# Patient Record
Sex: FEMALE | Race: WHITE | Employment: OTHER | URBAN - METROPOLITAN AREA
[De-identification: names, ages, dates, MRNs, and addresses within clinical notes are randomized per-mention and may not be internally consistent; named-entity substitution may affect disease eponyms.]

---

## 2017-09-28 ENCOUNTER — APPOINTMENT (OUTPATIENT)
Dept: CT IMAGING | Age: 67
DRG: 378 | End: 2017-09-28
Attending: EMERGENCY MEDICINE
Payer: MEDICARE

## 2017-09-28 ENCOUNTER — HOSPITAL ENCOUNTER (INPATIENT)
Age: 67
LOS: 3 days | Discharge: HOME | DRG: 378 | End: 2017-10-03
Attending: EMERGENCY MEDICINE | Admitting: INTERNAL MEDICINE
Payer: MEDICARE

## 2017-09-28 ENCOUNTER — APPOINTMENT (OUTPATIENT)
Dept: ULTRASOUND IMAGING | Age: 67
DRG: 378 | End: 2017-09-28
Attending: INTERNAL MEDICINE
Payer: MEDICARE

## 2017-09-28 ENCOUNTER — APPOINTMENT (OUTPATIENT)
Dept: GENERAL RADIOLOGY | Age: 67
DRG: 378 | End: 2017-09-28
Attending: EMERGENCY MEDICINE
Payer: MEDICARE

## 2017-09-28 DIAGNOSIS — R11.2 INTRACTABLE VOMITING WITH NAUSEA, UNSPECIFIED VOMITING TYPE: Primary | ICD-10-CM

## 2017-09-28 DIAGNOSIS — R00.0 TACHYCARDIA: ICD-10-CM

## 2017-09-28 DIAGNOSIS — R53.1 WEAKNESS: ICD-10-CM

## 2017-09-28 LAB
ALBUMIN SERPL-MCNC: 3 G/DL (ref 3.2–4.6)
ALBUMIN/GLOB SERPL: 0.6 {RATIO} (ref 1.2–3.5)
ALP SERPL-CCNC: 91 U/L (ref 50–136)
ALT SERPL-CCNC: 22 U/L (ref 12–65)
ANION GAP SERPL CALC-SCNC: 13 MMOL/L (ref 7–16)
AST SERPL-CCNC: 52 U/L (ref 15–37)
ATRIAL RATE: 109 BPM
BACTERIA URNS QL MICRO: ABNORMAL /HPF
BASOPHILS # BLD: 0 K/UL (ref 0–0.2)
BASOPHILS NFR BLD: 1 % (ref 0–2)
BILIRUB SERPL-MCNC: 0.9 MG/DL (ref 0.2–1.1)
BUN SERPL-MCNC: 9 MG/DL (ref 8–23)
CALCIUM SERPL-MCNC: 9.1 MG/DL (ref 8.3–10.4)
CALCULATED P AXIS, ECG09: 64 DEGREES
CALCULATED R AXIS, ECG10: 64 DEGREES
CALCULATED T AXIS, ECG11: 70 DEGREES
CASTS URNS QL MICRO: ABNORMAL /LPF
CHLORIDE SERPL-SCNC: 102 MMOL/L (ref 98–107)
CO2 SERPL-SCNC: 24 MMOL/L (ref 21–32)
CREAT SERPL-MCNC: 0.81 MG/DL (ref 0.6–1)
CRYSTALS URNS QL MICRO: 0 /LPF
D DIMER PPP FEU-MCNC: 1.83 UG/ML(FEU)
DIAGNOSIS, 93000: NORMAL
DIFFERENTIAL METHOD BLD: ABNORMAL
EOSINOPHIL # BLD: 0.6 K/UL (ref 0–0.8)
EOSINOPHIL NFR BLD: 7 % (ref 0.5–7.8)
EPI CELLS #/AREA URNS HPF: ABNORMAL /HPF
ERYTHROCYTE [DISTWIDTH] IN BLOOD BY AUTOMATED COUNT: 12.9 % (ref 11.9–14.6)
GLOBULIN SER CALC-MCNC: 5.2 G/DL (ref 2.3–3.5)
GLUCOSE SERPL-MCNC: 72 MG/DL (ref 65–100)
HCT VFR BLD AUTO: 36.6 % (ref 35.8–46.3)
HGB BLD-MCNC: 12.6 G/DL (ref 11.7–15.4)
IMM GRANULOCYTES # BLD: 0 K/UL (ref 0–0.5)
IMM GRANULOCYTES NFR BLD: 0.4 % (ref 0–5)
LACTATE BLD-SCNC: 1.6 MMOL/L (ref 0.5–1.9)
LIPASE SERPL-CCNC: 625 U/L (ref 73–393)
LYMPHOCYTES # BLD: 2.6 K/UL (ref 0.5–4.6)
LYMPHOCYTES NFR BLD: 30 % (ref 13–44)
MCH RBC QN AUTO: 28.8 PG (ref 26.1–32.9)
MCHC RBC AUTO-ENTMCNC: 34.4 G/DL (ref 31.4–35)
MCV RBC AUTO: 83.6 FL (ref 79.6–97.8)
MONOCYTES # BLD: 0.9 K/UL (ref 0.1–1.3)
MONOCYTES NFR BLD: 10 % (ref 4–12)
MUCOUS THREADS URNS QL MICRO: ABNORMAL /LPF
NEUTS SEG # BLD: 4.5 K/UL (ref 1.7–8.2)
NEUTS SEG NFR BLD: 52 % (ref 43–78)
OTHER OBSERVATIONS,UCOM: ABNORMAL
P-R INTERVAL, ECG05: 124 MS
PLATELET # BLD AUTO: 627 K/UL (ref 150–450)
PMV BLD AUTO: 9 FL (ref 10.8–14.1)
POTASSIUM SERPL-SCNC: 4 MMOL/L (ref 3.5–5.1)
PROCALCITONIN SERPL-MCNC: <0.1 NG/ML
PROT SERPL-MCNC: 8.2 G/DL (ref 6.3–8.2)
Q-T INTERVAL, ECG07: 294 MS
QRS DURATION, ECG06: 86 MS
QTC CALCULATION (BEZET), ECG08: 395 MS
RBC # BLD AUTO: 4.38 M/UL (ref 4.05–5.25)
RBC #/AREA URNS HPF: ABNORMAL /HPF
SODIUM SERPL-SCNC: 139 MMOL/L (ref 136–145)
T4 FREE SERPL-MCNC: 1.7 NG/DL (ref 0.78–1.46)
TROPONIN I BLD-MCNC: 0 NG/ML (ref 0.02–0.05)
TSH SERPL DL<=0.005 MIU/L-ACNC: 2.08 UIU/ML (ref 0.36–3.74)
VENTRICULAR RATE, ECG03: 109 BPM
WBC # BLD AUTO: 8.6 K/UL (ref 4.3–11.1)
WBC URNS QL MICRO: ABNORMAL /HPF

## 2017-09-28 PROCEDURE — 84443 ASSAY THYROID STIM HORMONE: CPT | Performed by: EMERGENCY MEDICINE

## 2017-09-28 PROCEDURE — 96376 TX/PRO/DX INJ SAME DRUG ADON: CPT | Performed by: EMERGENCY MEDICINE

## 2017-09-28 PROCEDURE — 83690 ASSAY OF LIPASE: CPT | Performed by: EMERGENCY MEDICINE

## 2017-09-28 PROCEDURE — 85025 COMPLETE CBC W/AUTO DIFF WBC: CPT | Performed by: EMERGENCY MEDICINE

## 2017-09-28 PROCEDURE — 81015 MICROSCOPIC EXAM OF URINE: CPT | Performed by: EMERGENCY MEDICINE

## 2017-09-28 PROCEDURE — 81003 URINALYSIS AUTO W/O SCOPE: CPT | Performed by: EMERGENCY MEDICINE

## 2017-09-28 PROCEDURE — 84439 ASSAY OF FREE THYROXINE: CPT | Performed by: EMERGENCY MEDICINE

## 2017-09-28 PROCEDURE — 71260 CT THORAX DX C+: CPT

## 2017-09-28 PROCEDURE — 99285 EMERGENCY DEPT VISIT HI MDM: CPT | Performed by: EMERGENCY MEDICINE

## 2017-09-28 PROCEDURE — 80053 COMPREHEN METABOLIC PANEL: CPT | Performed by: EMERGENCY MEDICINE

## 2017-09-28 PROCEDURE — 83605 ASSAY OF LACTIC ACID: CPT

## 2017-09-28 PROCEDURE — 99218 HC RM OBSERVATION: CPT

## 2017-09-28 PROCEDURE — 74022 RADEX COMPL AQT ABD SERIES: CPT

## 2017-09-28 PROCEDURE — 85379 FIBRIN DEGRADATION QUANT: CPT | Performed by: EMERGENCY MEDICINE

## 2017-09-28 PROCEDURE — 74011250636 HC RX REV CODE- 250/636: Performed by: EMERGENCY MEDICINE

## 2017-09-28 PROCEDURE — 74011000258 HC RX REV CODE- 258: Performed by: EMERGENCY MEDICINE

## 2017-09-28 PROCEDURE — 84484 ASSAY OF TROPONIN QUANT: CPT

## 2017-09-28 PROCEDURE — 74011636320 HC RX REV CODE- 636/320: Performed by: EMERGENCY MEDICINE

## 2017-09-28 PROCEDURE — 96361 HYDRATE IV INFUSION ADD-ON: CPT | Performed by: EMERGENCY MEDICINE

## 2017-09-28 PROCEDURE — 96374 THER/PROPH/DIAG INJ IV PUSH: CPT | Performed by: EMERGENCY MEDICINE

## 2017-09-28 PROCEDURE — 96375 TX/PRO/DX INJ NEW DRUG ADDON: CPT | Performed by: EMERGENCY MEDICINE

## 2017-09-28 PROCEDURE — 96372 THER/PROPH/DIAG INJ SC/IM: CPT | Performed by: EMERGENCY MEDICINE

## 2017-09-28 PROCEDURE — 74011250637 HC RX REV CODE- 250/637: Performed by: INTERNAL MEDICINE

## 2017-09-28 PROCEDURE — 84145 PROCALCITONIN (PCT): CPT | Performed by: EMERGENCY MEDICINE

## 2017-09-28 PROCEDURE — 93005 ELECTROCARDIOGRAM TRACING: CPT | Performed by: EMERGENCY MEDICINE

## 2017-09-28 PROCEDURE — 76705 ECHO EXAM OF ABDOMEN: CPT

## 2017-09-28 RX ORDER — SODIUM CHLORIDE 0.9 % (FLUSH) 0.9 %
5-10 SYRINGE (ML) INJECTION EVERY 8 HOURS
Status: DISCONTINUED | OUTPATIENT
Start: 2017-09-29 | End: 2017-10-03 | Stop reason: HOSPADM

## 2017-09-28 RX ORDER — MORPHINE SULFATE 4 MG/ML
4 INJECTION, SOLUTION INTRAMUSCULAR; INTRAVENOUS
Status: COMPLETED | OUTPATIENT
Start: 2017-09-28 | End: 2017-09-28

## 2017-09-28 RX ORDER — ONDANSETRON 2 MG/ML
4 INJECTION INTRAMUSCULAR; INTRAVENOUS
Status: COMPLETED | OUTPATIENT
Start: 2017-09-28 | End: 2017-09-28

## 2017-09-28 RX ORDER — SODIUM CHLORIDE 0.9 % (FLUSH) 0.9 %
10 SYRINGE (ML) INJECTION
Status: COMPLETED | OUTPATIENT
Start: 2017-09-28 | End: 2017-09-28

## 2017-09-28 RX ORDER — ONDANSETRON 4 MG/1
4 TABLET, ORALLY DISINTEGRATING ORAL
Status: DISCONTINUED | OUTPATIENT
Start: 2017-09-28 | End: 2017-10-03 | Stop reason: HOSPADM

## 2017-09-28 RX ORDER — HEPARIN SODIUM 5000 [USP'U]/ML
5000 INJECTION, SOLUTION INTRAVENOUS; SUBCUTANEOUS EVERY 8 HOURS
Status: DISCONTINUED | OUTPATIENT
Start: 2017-09-29 | End: 2017-09-29

## 2017-09-28 RX ORDER — ACETAMINOPHEN 325 MG/1
650 TABLET ORAL
Status: DISCONTINUED | OUTPATIENT
Start: 2017-09-28 | End: 2017-10-03 | Stop reason: HOSPADM

## 2017-09-28 RX ORDER — FAMOTIDINE 20 MG/1
20 TABLET, FILM COATED ORAL 2 TIMES DAILY
Status: DISCONTINUED | OUTPATIENT
Start: 2017-09-29 | End: 2017-09-29

## 2017-09-28 RX ORDER — SODIUM CHLORIDE 9 MG/ML
75 INJECTION, SOLUTION INTRAVENOUS CONTINUOUS
Status: DISCONTINUED | OUTPATIENT
Start: 2017-09-29 | End: 2017-09-29

## 2017-09-28 RX ORDER — SODIUM CHLORIDE 0.9 % (FLUSH) 0.9 %
5-10 SYRINGE (ML) INJECTION AS NEEDED
Status: DISCONTINUED | OUTPATIENT
Start: 2017-09-28 | End: 2017-10-03 | Stop reason: HOSPADM

## 2017-09-28 RX ORDER — PROMETHAZINE HYDROCHLORIDE 25 MG/ML
25 INJECTION, SOLUTION INTRAMUSCULAR; INTRAVENOUS
Status: COMPLETED | OUTPATIENT
Start: 2017-09-28 | End: 2017-09-28

## 2017-09-28 RX ADMIN — SODIUM CHLORIDE 1000 ML: 900 INJECTION, SOLUTION INTRAVENOUS at 21:14

## 2017-09-28 RX ADMIN — SODIUM CHLORIDE 1000 ML: 900 INJECTION, SOLUTION INTRAVENOUS at 15:51

## 2017-09-28 RX ADMIN — IOPAMIDOL 100 ML: 755 INJECTION, SOLUTION INTRAVENOUS at 18:52

## 2017-09-28 RX ADMIN — Medication 10 ML: at 18:53

## 2017-09-28 RX ADMIN — SODIUM CHLORIDE 100 ML: 900 INJECTION, SOLUTION INTRAVENOUS at 18:53

## 2017-09-28 RX ADMIN — ONDANSETRON 4 MG: 2 INJECTION INTRAMUSCULAR; INTRAVENOUS at 15:51

## 2017-09-28 RX ADMIN — PROMETHAZINE HYDROCHLORIDE 25 MG: 25 INJECTION INTRAMUSCULAR; INTRAVENOUS at 21:14

## 2017-09-28 RX ADMIN — MORPHINE SULFATE 4 MG: 4 INJECTION, SOLUTION INTRAMUSCULAR; INTRAVENOUS at 18:11

## 2017-09-28 RX ADMIN — ONDANSETRON 4 MG: 2 INJECTION INTRAMUSCULAR; INTRAVENOUS at 18:11

## 2017-09-28 NOTE — IP AVS SNAPSHOT
Summary of Care Report The Summary of Care report has been created to help improve care coordination. Users with access to [facility] or [facility] [address] (Web-based application) may access additional patient information including the Discharge Summary. If you are not currently a [address] user and need more information, please call the number listed below in the [place] [address] section and ask to be connected with Medical Records. Facility Information Name Address Phone [address] [address] [address] [phone] Patient Information Patient Name Sex  Olivia Keith ([id number]) Female 33 17 13 Discharge Information Admitting Provider Service Area Unit Cayden Moreno MD / [id number] Med Surg / [phone] Discharge Provider Discharge Date/Time Discharge Disposition Destination (none) 10/3/2017 Morning (Pending) AHR (none) Patient Language Language ENGLISH [13] Hospital Problems as of 10/3/2017  Never Reviewed Class Noted - Resolved Last Modified POA Active Problems Intractable nausea and vomiting  2017 - Present 10/3/2017 by Mukund Paniagua MD Yes Entered by Cayden Moreno MD  
  * (Principal)Duodenal ulcer  10/3/2017 - Present 10/3/2017 by Mukund Paniagua MD Yes Entered by Mukund Paniagua MD  
  Hypertension (Chronic)  10/3/2017 - Present 10/3/2017 by Mukund Paniagua MD Yes Entered by Mukund Paniagua MD  
  
Non-Hospital Problems as of 10/3/2017  Never Reviewed Class Noted - Resolved Last Modified Active Problems CVA (cerebral infarction)  10/27/2015 - Present 10/28/2015 by Scotty Reeves MD  
  Entered by Levar Logan   Basal ganglia infarction ([facility] [address]  10/28/2015 - Present 10/28/2015 by Scotty Reeves MD  
 Entered by Mikaela Hills MD  
  Left hemiparesis ([address]  10/28/2015 - Present 10/28/2015 by Mikaela Hills MD  
  Entered by Mikaela Hills MD  
  
You are allergic to the following No active allergies Current Discharge Medication List  
  
START taking these medications Dose & Instructions Dispensing Information Comments  
 acetaminophen 325 mg tablet Commonly known as:  TYLENOL Dose:  650 mg Take 2 Tabs by mouth every four (4) hours as needed. Quantity:  12 Tab Refills:  0  
   
 atorvastatin 40 mg tablet Commonly known as:  LIPITOR Dose:  40 mg Take 1 Tab by mouth nightly. Quantity:  1 Tab Refills:  0  
   
 metoprolol tartrate 25 mg tablet Commonly known as:  LOPRESSOR Dose:  12.5 mg Take 0.5 Tabs by mouth two (2) times a day. Quantity:  1 Tab Refills:  0  
   
 pantoprazole 40 mg tablet Commonly known as:  PROTONIX Dose:  40 mg Take 1 Tab by mouth two (2) times a day. Quantity:  60 Tab Refills:  0  
   
 promethazine 25 mg tablet Commonly known as:  PHENERGAN Dose:  25 mg Take 1 Tab by mouth every eight (8) hours as needed for Nausea. Quantity:  12 Tab Refills:  0 CONTINUE these medications which have CHANGED Dose & Instructions Dispensing Information Comments  
 lisinopril 5 mg tablet Commonly known as:  Natty Archer What changed:   
- medication strength 
- how much to take Dose:  2.5 mg Take 0.5 Tabs by mouth daily. Quantity:  30 Tab Refills:  0 CONTINUE these medications which have NOT CHANGED Dose & Instructions Dispensing Information Comments  
 aspirin 325 mg tablet Commonly known as:  ASPIRIN Start taking on:  10/10/2017 Dose:  325 mg Take 1 Tab by mouth daily. Quantity:  1 Tab Refills:  0 HYDROcodone-acetaminophen 5-325 mg per tablet Commonly known as:  Sterling Dolphin Dose:  1 Tab Take 1 Tab by mouth every six (6) hours as needed. Max Daily Amount: 4 Tabs. Quantity:  20 Tab Refills:  0 PAXIL 10 mg tablet Generic drug:  PARoxetine Dose:  10 mg Take 10 mg by mouth daily. Refills:  0 SINGULAIR 10 mg tablet Generic drug:  montelukast  
 Dose:  10 mg Take 10 mg by mouth daily. Refills:  0 ZyrTEC 10 mg Cap Generic drug:  Cetirizine Take  by mouth. Refills:  0 STOP taking these medications Comments  
 pravastatin 40 mg tablet Commonly known as:  PRAVACHOL Current Immunizations Name Date  
 TB Skin Test (PPD) Intradermal 8/24/2017, 10/28/2015 Surgery Information ID Date/Time Status Primary Surgeon All Procedures Location [id number] 10/2/2017 11:15 AM Posted Julita Brito MD ESOPHAGOGASTRODUODENOSCOPY (EGD) SIGMOIDOSCOPY FLEXIBLE 
ESOPHAGOGASTRODUODENAL (EGD) BIOPSY [facility]    
 ESOPHAGOGASTRODUODENOSCOPY (EGD):  room 627 ESOPHAGOGASTRODUODENAL (EGD) BIOPSY:  gastric bx's Follow-up Information Follow up With Details Comments Contact Info [facility] On 10/25/2017 at 10:15 [address] [facility] [address] [facility] [phone] 
[phone] [facility] On 10/5/2017 at 9:00 Doctor Shi Landin1 
[phone] Discharge Instructions Hold Aspirin for 7 days Take Protonix 40 mg PO BID until seen by gastroenterology AVOID NSAIDs (Advisl, Naproxen, etc), spicy food, alcohol Follow-up with PCP in 3-5 days with repeat CBC, BMP and Mg level at that time Follow-up with gastroenterology in 2 weeks H. Pilory IgM  And pathology pending at the time of discharge, please discuss with gastroenterology and PCP results and further management.   
If any worrisome signs or symptoms, alike increased abdominal pain, fevers, diarrhea, blood in stool or vomitus please call PCP, Gastroenterology, 911 or go to nearest ED. DISCHARGE SUMMARY from Nurse The following personal items are in your possession at time of discharge: 
 
Dental Appliances: None Visual Aid: Contacts, At home Hearing Aids/Status: At home, Bilateral 
Home Medications:  (Inhaler;  will bring meds in am ) Jewelry: None Clothing: At bedside Other Valuables: Cell Phone, At bedside Personal Items Sent to Safe:  (None) PATIENT INSTRUCTIONS: 
 
 
F-face looks uneven A-arms unable to move or move unevenly S-speech slurred or non-existent T-time-call 911 as soon as signs and symptoms begin-DO NOT go Back to bed or wait to see if you get better-TIME IS BRAIN. Warning Signs of HEART ATTACK Call 911 if you have these symptoms: 
? Chest discomfort. Most heart attacks involve discomfort in the center of the chest that lasts more than a few minutes, or that goes away and comes back. It can feel like uncomfortable pressure, squeezing, fullness, or pain. ? Discomfort in other areas of the upper body. Symptoms can include pain or discomfort in one or both arms, the back, neck, jaw, or stomach. ? Shortness of breath with or without chest discomfort. ? Other signs may include breaking out in a cold sweat, nausea, or lightheadedness. Don't wait more than five minutes to call [address]! Fast action can save your life. Calling 911 is almost always the fastest way to get lifesaving treatment. Emergency Medical Services staff can begin treatment when they arrive  up to an hour sooner than if someone gets to the hospital by car. The discharge information has been reviewed with the patient. The patient verbalized understanding.  
 
Discharge medications reviewed with the patient and appropriate educational materials and side effects teaching were provided. Chart Review Routing History No Routing History on File

## 2017-09-28 NOTE — ED PROVIDER NOTES
HPI Comments: Patient coming in with persistent fatigue generalized weakness poor appetite some persistent abdominal pain. She's been admitted [facility] twice over the last month after having a cholecystectomy followed by  Persistent pain and vomiting. She states they never really found anything else wrong with her but she has continued to not do well she went and saw her PCP today and was tachycardic with a low blood pressure as well. She states they were concerned she was getting dehydrated. She did not wish to go back to [facility] where she had her surgery. Patient is a 79 y.o. female presenting with abdominal pain. The history is provided by the patient. Abdominal Pain    Associated symptoms include nausea and vomiting. Pertinent negatives include no fever, no diarrhea, no constipation and no back pain. Past Medical History:   Diagnosis Date    Hypertension     Stroke [facility])        Past Surgical History:   Procedure Laterality Date    HX CHOLECYSTECTOMY      HX GYN      hysterectomy    HX HEENT      left ear surgery         History reviewed. No pertinent family history. Social History     Social History    Marital status:      Spouse name: N/A    Number of children: N/A    Years of education: N/A     Occupational History    Not on file. Social History Main Topics    Smoking status: Never Smoker    Smokeless tobacco: Not on file    Alcohol use No    Drug use: No    Sexual activity: Not on file     Other Topics Concern    Not on file     Social History Narrative         ALLERGIES: Review of patient's allergies indicates no known allergies. Review of Systems   Constitutional: Positive for activity change, appetite change and fatigue. Negative for fever. Respiratory: Positive for shortness of breath. Negative for apnea, cough and chest tightness. Gastrointestinal: Positive for abdominal pain, nausea and vomiting.  Negative for constipation and diarrhea. Musculoskeletal: Negative for back pain. Skin: Negative for color change and wound. Neurological: Positive for weakness. Negative for seizures, speech difficulty and numbness. All other systems reviewed and are negative. Vitals:    09/28/17 1505   BP: 102/78   Pulse: (!) 140   Resp: 26   Temp: 97.8 °F (36.6 °C)   SpO2: 97%   Weight: 59.9 kg (132 lb)   Height: 5' 2\" (1.575 m)            Physical Exam   Constitutional: She is oriented to person, place, and time. She appears well-developed and well-nourished. No distress. HENT:   Head: Normocephalic and atraumatic. Dry mucus membranes   Eyes: Conjunctivae are normal. No scleral icterus. Neck: Normal range of motion. Neck supple. Cardiovascular: Normal rate, regular rhythm and normal heart sounds. Pulmonary/Chest: Effort normal and breath sounds normal. No stridor. No respiratory distress. She has no wheezes. She has no rales. She exhibits no tenderness. Abdominal: Soft. Bowel sounds are normal. She exhibits no distension. There is tenderness (slight tenderness to the epigastric region. incisions c/d/i). There is no rebound and no guarding. Neurological: She is alert and oriented to person, place, and time. No focal weakness   Skin: Skin is warm and dry. No rash noted. She is not diaphoretic. No erythema. There is pallor. Psychiatric: She has a normal mood and affect. Her behavior is normal.   Nursing note and vitals reviewed. MDM  Number of Diagnoses or Management Options  Intractable vomiting with nausea, unspecified vomiting type: Tachycardia:   Weakness:   Diagnosis management comments: Patient has persistent nausea and still does not feel well. I have done an extensive workup ruling out pulmonary embolism she does still have a persistent tachycardia but this is improved with fluid I am giving her additional fluid now. Patient does have a lot of ketones in her urine, lipase slightly elevated as is free T4.    I have discussed case with hospitalist for further evaluation. Yeimy Conley MD; 9/28/2017 @8:54 PM Voice dictation software was used during the making of this note. This software is not perfect and grammatical and other typographical errors may be present.   This note has not been proofread for errors.  ===================================================================        Amount and/or Complexity of Data Reviewed  Clinical lab tests: ordered and reviewed (Results for orders placed or performed during the hospital encounter of 09/28/17  -CBC WITH AUTOMATED DIFF       Result                                            Value                         Ref Range                       WBC                                               8.6                           4.3 - 11.1 K/uL                 RBC                                               4.38                          4.05 - 5.25 M/uL                HGB                                               12.6                          11.7 - 15.4 g/dL                HCT                                               36.6                          35.8 - 46.3 %                   MCV                                               83.6                          79.6 - 97.8 FL                  MCH                                               28.8                          26.1 - 32.9 PG                  MCHC                                              34.4                          31.4 - 35.0 g/dL                RDW                                               12.9                          11.9 - 14.6 %                   PLATELET                                          627 (H)                       150 - 450 K/uL                  MPV                                               9.0 (L)                       10.8 - 14.1 FL                  DF                                                AUTOMATED                                                     NEUTROPHILS 52                            43 - 78 %                       LYMPHOCYTES                                       30                            13 - 44 %                       MONOCYTES                                         10                            4.0 - 12.0 %                    EOSINOPHILS                                       7                             0.5 - 7.8 %                     BASOPHILS                                         1                             0.0 - 2.0 %                     IMMATURE GRANULOCYTES                             0.4                           0.0 - 5.0 %                     ABS. NEUTROPHILS                                  4.5                           1.7 - 8.2 K/UL                  ABS. LYMPHOCYTES                                  2.6                           0.5 - 4.6 K/UL                  ABS. MONOCYTES                                    0.9                           0.1 - 1.3 K/UL                  ABS. EOSINOPHILS                                  0.6                           0.0 - 0.8 K/UL                  ABS. BASOPHILS                                    0.0                           0.0 - 0.2 K/UL                  ABS. IMM.  GRANS.                                  0.0                           0.0 - 0.5 K/UL             -METABOLIC PANEL, COMPREHENSIVE       Result                                            Value                         Ref Range                       Sodium                                            139                           136 - 145 mmol/L                Potassium                                         4.0                           3.5 - 5.1 mmol/L                Chloride                                          102                           98 - 107 mmol/L                 CO2                                               24                            21 - 32 mmol/L                  Anion gap 13                            7 - 16 mmol/L                   Glucose                                           72                            65 - 100 mg/dL                  BUN                                               9                             8 - 23 MG/DL                    Creatinine                                        0.81                          0.6 - 1.0 MG/DL                 GFR est AA                                        >60                           >60 ml/min/1.73m2               GFR est non-AA                                    >60                           >60 ml/min/1.73m2               Calcium                                           9.1                           8.3 - 10.4 MG/DL                Bilirubin, total                                  0.9                           0.2 - 1.1 MG/DL                 ALT (SGPT)                                        22                            12 - 65 U/L                     AST (SGOT)                                        52 (H)                        15 - 37 U/L                     Alk. phosphatase                                  91                            50 - 136 U/L                    Protein, total                                    8.2                           6.3 - 8.2 g/dL                  Albumin                                           3.0 (L)                       3.2 - 4.6 g/dL                  Globulin                                          5.2 (H)                       2.3 - 3.5 g/dL                  A-G Ratio                                         0.6 (L)                       1.2 - 3.5                  -LIPASE       Result                                            Value                         Ref Range                       Lipase                                            625 (H)                       73 - 393 U/L               -PROCALCITONIN       Result                                            Value Ref Range                       Procalcitonin                                     <0.1                          ng/mL                      -D DIMER       Result                                            Value                         Ref Range                       D DIMER                                           1.83 (HH)                     <0.55 ug/ml(FEU)           -TSH 3RD GENERATION       Result                                            Value                         Ref Range                       TSH                                               2.080                         0.358 - 3.740 uIU/mL       -T4, FREE       Result                                            Value                         Ref Range                       T4, Free                                          1.7 (H)                       0.78 - 1.46 NG/DL          -POC TROPONIN-I       Result                                            Value                         Ref Range                       Troponin-I (POC)                                  0 (L)                         0.02 - 0.05 ng/ml          -POC LACTIC ACID       Result                                            Value                         Ref Range                       Lactic Acid (POC)                                 1.6                           0.5 - 1.9 mmol/L           -EKG, 12 LEAD, INITIAL       Result                                            Value                         Ref Range                       Ventricular Rate                                  109                           BPM                             Atrial Rate                                       109                           BPM                             P-R Interval                                      124                           ms                              QRS Duration                                      86                            ms Q-T Interval                                      294                           ms                              QTC Calculation (Bezet)                           395                           ms                              Calculated P Axis                                 64                            degrees                         Calculated R Axis                                 64                            degrees                         Calculated T Axis                                 70                            degrees                         Diagnosis                                                                                                   !! AGE AND GENDER SPECIFIC ECG ANALYSIS !! Sinus tachycardia   Nonspecific T wave abnormality   Abnormal ECG   When compared with ECG of 26-OCT-2015 15:06,   Nonspecific T wave abnormality now evident in Lateral leads   QT has shortened   Confirmed by DIANA RODARTE (), Carlos Alberto Monotya ([id number]) on 9/28/2017 6:07:32 PM    )  Tests in the radiology section of CPT®: ordered and reviewed (Xr Abd Acute W 1 V Chest    Result Date: 9/28/2017  ACUTE ABDOMINAL SERIES, 3 VIEWS. HISTORY: Vomiting. Recent cholecystectomy. . TECHNIQUE: AP view of the chest, flat and upright views of the abdomen on a total of 4 images. COMPARISON: 26 October 2015. FINDINGS: The bowel gas pattern is unremarkable. No abnormally dilated bowel loops. No free air under the hemidiaphragm. No radiopaque calculi. The lung bases are clear. Surgical changes right upper quadrant. Electronic device over the chest.     IMPRESSION: Negative for free air, ileus or obstruction. Ct Chest W Cont    Result Date: 9/28/2017  Chest CT INDICATION:  Recent gallbladder surgery, now tachycardia and shortness of breath Multiple axial images were obtained through the chest during intravenous infusion of 100mL of Isovue 370. Coronal reformats were also evaluated.  Radiation dose reduction techniques were used for this study: All CT scans performed at this facility use one or all of the following: Automated exposure control, adjustment of the mA and/or kVp according to patient's size, iterative reconstruction. FINDINGS: There is normal enhancement of the major pulmonary vessels. There is no CT evidence of pulmonary embolus. There is also normal enhancement of the aorta. There is no dissection or aneurysm. There is a 3 m nodule in the right lower lobe and a 2 mm nodule in the right middle lobe. There are no significant infiltrates. There are no effusions. There is no significant mediastinal or axillary adenopathy. There are no bony lesions. Limited imaging of the upper abdomen is also unremarkable. IMPRESSION: 1. No CT evidence of pulmonary embolus. 2.  Small nodules in the right lung base, likely benign.   If the patient has a smoking history or other risk factors, consider 6 month follow-up.    )  Independent visualization of images, tracings, or specimens: yes      ED Course       Procedures

## 2017-09-28 NOTE — IP AVS SNAPSHOT
Ronak Adair 
 
 
 [address] 
[phone] Patient: Bryan Lyn MRN: [id number] [facility]:[birth year] You are allergic to the following No active allergies Recent Documentation Height Weight Breastfeeding? BMI OB Status Smoking Status 1.575 m 59.9 kg No 24.14 kg/m2 Hysterectomy Never Smoker Emergency Contacts Name Discharge Info Relation Home Work Mobile Oral Priestly  Spouse [3] [phone] About your hospitalization You were admitted on:  September 28, 2017 You last received care in the:  [facility] 6 MED SURG You were discharged on:  October 3, 2017 Unit phone number:  [phone] Why you were hospitalized Your primary diagnosis was:  Duodenal Ulcer Your diagnoses also included:  Intractable Nausea And Vomiting, Hypertension Providers Seen During Your Hospitalizations Provider Role Specialty Primary office phone Hina Echeverria MD Attending Provider Emergency Medicine [phone] Meme Gallardo MD Attending Provider Internal Medicine [phone] Your Primary Care Physician (PCP) Primary Care Physician Office Phone Office Fax NOT ON FILE ** None ** ** None ** Follow-up Information Follow up With Details Comments Contact Info [facility] On 10/25/2017 at 10:15 [address] [id number] [phone] [facility] On 10/5/2017 at 9:00 Doctor Shi Diaz 2891 [phone] Current Discharge Medication List  
  
START taking these medications Dose & Instructions Dispensing Information Comments Morning Noon Evening Bedtime  
 acetaminophen 325 mg tablet Commonly known as:  TYLENOL Your next dose is: Take on as needed schedule Dose:  650 mg Take 2 Tabs by mouth every four (4) hours as needed. Quantity:  12 Tab Refills:  0  
     
   
   
   
  
 atorvastatin 40 mg tablet Commonly known as:  LIPITOR Your next dose is: This evening Dose:  40 mg Take 1 Tab by mouth nightly. Quantity:  1 Tab Refills:  0  
     
   
   
  
   
  
 metoprolol tartrate 25 mg tablet Commonly known as:  LOPRESSOR Your next dose is: This evening Dose:  12.5 mg Take 0.5 Tabs by mouth two (2) times a day. Quantity:  1 Tab Refills:  0  
     
  
   
   
  
   
  
 pantoprazole 40 mg tablet Commonly known as:  PROTONIX Your next dose is: This evening Dose:  40 mg Take 1 Tab by mouth two (2) times a day. Quantity:  60 Tab Refills:  0  
     
  
   
   
  
   
  
 promethazine 25 mg tablet Commonly known as:  PHENERGAN Your next dose is: Take on as needed schedule Dose:  25 mg Take 1 Tab by mouth every eight (8) hours as needed for Nausea. Quantity:  12 Tab Refills:  0 CONTINUE these medications which have CHANGED Dose & Instructions Dispensing Information Comments Morning Noon Evening Bedtime  
 lisinopril 5 mg tablet Commonly known as:  M[place] Lights What changed:   
- medication strength 
- how much to take Your next dose is:  Tomorrow Morning Dose:  2.5 mg Take 0.5 Tabs by mouth daily. Quantity:  30 Tab Refills:  0 CONTINUE these medications which have NOT CHANGED Dose & Instructions Dispensing Information Comments Morning Noon Evening Bedtime  
 aspirin 325 mg tablet Commonly known as:  ASPIRIN Start taking on:  10/10/2017 Your next dose is:  Resume on 10/10 Dose:  325 mg Take 1 Tab by mouth daily. Quantity:  1 Tab Refills:  0 HYDROcodone-acetaminophen 5-325 mg per tablet Commonly known as:  Urszula Kind Your next dose is: Take on as needed schedule Dose:  1 Tab Take 1 Tab by mouth every six (6) hours as needed. Max Daily Amount: 4 Tabs. Quantity:  20 Tab Refills:  0 PAXIL 10 mg tablet Generic drug:  PARoxetine Your next dose is:  Tomorrow Morning Dose:  10 mg Take 10 mg by mouth daily. Refills:  0 SINGULAIR 10 mg tablet Generic drug:  montelukast  
Your next dose is:  Tomorrow Morning Dose:  10 mg Take 10 mg by mouth daily. Refills:  0 ZyrTEC 10 mg Cap Generic drug:  Cetirizine Your next dose is:  Tomorrow Morning Take  by mouth. Refills:  0 STOP taking these medications   
 pravastatin 40 mg tablet Commonly known as:  PRAVACHOL Where to Get Your Medications Information on where to get these meds will be given to you by the nurse or doctor. ! Ask your nurse or doctor about these medications  
  acetaminophen 325 mg tablet  
 aspirin 325 mg tablet  
 atorvastatin 40 mg tablet  
 lisinopril 5 mg tablet  
 metoprolol tartrate 25 mg tablet  
 pantoprazole 40 mg tablet  
 promethazine 25 mg tablet Discharge Instructions Hold Aspirin for 7 days Take Protonix 40 mg PO BID until seen by gastroenterology AVOID NSAIDs (Advisl, Naproxen, etc), spicy food, alcohol Follow-up with PCP in 3-5 days with repeat CBC, BMP and Mg level at that time Follow-up with gastroenterology in 2 weeks H. Pilory IgM  And pathology pending at the time of discharge, please discuss with gastroenterology and PCP results and further management. If any worrisome signs or symptoms, alike increased abdominal pain, fevers, diarrhea, blood in stool or vomitus please call PCP, Gastroenterology, 911 or go to nearest ED. DISCHARGE SUMMARY from Nurse The following personal items are in your possession at time of discharge: 
 
Dental Appliances: None Visual Aid: Contacts, At home Hearing Aids/Status: At home, Bilateral 
Home Medications:  (Inhaler;  will bring meds in am ) Jewelry: None Clothing: At bedside Other Valuables: Cell Phone, At bedside Personal Items Sent to Safe:  (None) PATIENT INSTRUCTIONS: 
 
 
F-face looks uneven A-arms unable to move or move unevenly S-speech slurred or non-existent T-time-call 911 as soon as signs and symptoms begin-DO NOT go Back to bed or wait to see if you get better-TIME IS BRAIN. Warning Signs of HEART ATTACK Call 911 if you have these symptoms: 
? Chest discomfort. Most heart attacks involve discomfort in the center of the chest that lasts more than a few minutes, or that goes away and comes back. It can feel like uncomfortable pressure, squeezing, fullness, or pain. ? Discomfort in other areas of the upper body. Symptoms can include pain or discomfort in one or both arms, the back, neck, jaw, or stomach. ? Shortness of breath with or without chest discomfort. ? Other signs may include breaking out in a cold sweat, nausea, or lightheadedness. Don't wait more than five minutes to call [address]! Fast action can save your life. Calling 911 is almost always the fastest way to get lifesaving treatment. Emergency Medical Services staff can begin treatment when they arrive  up to an hour sooner than if someone gets to the hospital by car. The discharge information has been reviewed with the patient. The patient verbalized understanding. Discharge medications reviewed with the patient and appropriate educational materials and side effects teaching were provided. Discharge Instructions Attachments/References PEPTIC ULCER DISEASE (ENGLISH) DIVERTICULOSIS (ENGLISH) Discharge Orders Procedure Order Date Status Priority Quantity Spec Type Associated Dx CBC WITH AUTOMATED DIFF 10/03/17 1003 Future Routine 1 Blood Comments:  On 10/6/2017 - call PCP with results METABOLIC PANEL, BASIC 36/99/23 1003 Future Routine 1 Blood Comments:  On 10/6/2017 - call PCP with results Check Mg level. [email or web address] Announcement We are excited to announce that we are making your provider's discharge notes available to you in [email or web address]. You will see these notes when they are completed and signed by the physician that discharged you from your recent hospital stay. If you have any questions or concerns about any information you see in [email or web address], please call the Health Information Department where you were seen or reach out to your Primary Care Provider for more information about your plan of care. Introducing [facility] SERVICES! Rishi Diaz introduces [email or web address] patient portal. Now you can access parts of your medical record, email your doctor's office, and request medication refills online. 1. In your internet browser, go to [email or web address] 2. Click on the First Time User? Click Here link in the Sign In box. You will see the New Member Sign Up page. 3. Enter your [email or web address] Access Code exactly as it appears below. You will not need to use this code after youve completed the sign-up process. If you do not sign up before the expiration date, you must request a new code. · [email or web address] Access Code: [email or web address] Expires: 1/1/2018 10:31 AM 
 
4. Enter the last four digits of your Social Security Number (xxxx) and Date of Birth (mm/dd/yyyy) as indicated and click Submit. You will be taken to the next sign-up page. 5. Create a [email or web address] ID. This will be your [email or web address] login ID and cannot be changed, so think of one that is secure and easy to remember. 6. Create a [email or web address] password. You can change your password at any time. 7. Enter your Password Reset Question and Answer. This can be used at a later time if you forget your password. 8. Enter your e-mail address. You will receive e-mail notification when new information is available in [address] 9. Click Sign Up. You can now view and download portions of your medical record. 10. Click the Download Summary menu link to download a portable copy of your medical information. If you have questions, please visit the Frequently Asked Questions section of the [employer] website. Remember, [employer] is NOT to be used for urgent needs. For medical emergencies, dial 911. Now available from your iPhone and Android! General Information Please provide this summary of care documentation to your next provider. Patient Signature:  ____________________________________________________________ Date:  ____________________________________________________________  
  
José Police Provider Signature:  ____________________________________________________________ Date:  ____________________________________________________________ More Information Peptic Ulcer Disease: Care Instructions Your Care Instructions Peptic ulcers are sores on the inside of the stomach or the small intestine. They are usually caused by an infection with bacteria or from use of nonsteroidal anti-inflammatory drugs (NSAIDs). NSAIDs include aspirin, ibuprofen (Advil), and naproxen (Aleve). Your doctor may have prescribed medicine to reduce stomach acid. You also may need to take antibiotics if your peptic ulcers are caused by an infection. You can help your stomach heal and keep ulcers from coming back by making some changes in your lifestyle. Quit smoking, limit caffeine and alcohol, and reduce stress. Follow-up care is a key part of your treatment and safety.  Be sure to make and go to all appointments, and call your doctor if you are having problems. Its also a good idea to know your test results and keep a list of the medicines you take. How can you care for yourself at home? · Take your medicines exactly as prescribed. Call your doctor if you think you are having a problem with your medicine. · Do not take aspirin or other NSAIDs such as ibuprofen (Advil or Motrin) or naproxen (Aleve). Ask your doctor what you can take for pain. · Do not smoke. Smoking can make ulcers worse. If you need help quitting, talk to your doctor about stop-smoking programs and medicines. These can increase your chances of quitting for good. · Drink in moderation or avoid drinking alcohol. · Do not drink beverages that have caffeine if they bother your stomach. These include coffee, tea, and soda. · Eat a balanced diet of small, frequent meals. Make an appointment with a dietitian if you need help planning your meals. · Reduce stress. Avoid people and places that make you feel anxious, if you can. Learn ways to reduce stress, such as biofeedback, guided imagery, and meditation. When should you call for help? Call 911 anytime you think you may need emergency care. For example, call if: 
· You passed out (lost consciousness). · You vomit blood or what looks like coffee grounds. · You pass maroon or very bloody stools. Call your doctor now or seek immediate medical care if: 
· You have severe pain in your belly, back, or shoulders. · You have new or worsening belly pain. · You are dizzy or lightheaded, or you feel like you may faint. · Your stools are black and tarlike or have streaks of blood. Watch closely for changes in your health, and be sure to contact your doctor if: 
· You have new symptoms such as weight loss, nausea or vomiting. · You do not feel better as expected. Where can you learn more? Go to http://gloria-atul.info/. Enter N522 in the search box to learn more about \"Peptic Ulcer Disease: Care Instructions. \" 
 Current as of: August 9, 2016 Content Version: 11.3 © 1294-6679 [employer]. Care instructions adapted under license by [employer] (which disclaims liability or warranty for this information). If you have questions about a medical condition or this instruction, always ask your healthcare professional. [address] any warranty or liability for your use of this information. Diverticulosis: Care Instructions Your Care Instructions In diverticulosis, pouches called diverticula form in the wall of the large intestine (colon). The pouches do not cause any pain or other symptoms. Most people who have diverticulosis do not know they have it. But the pouches sometimes bleed, and if they become infected, they can cause pain and other symptoms. When this happens, it is called diverticulitis. Diverticula form when pressure pushes the wall of the colon outward at certain weak points. A diet that is too low in fiber can cause diverticula. Follow-up care is a key part of your treatment and safety. Be sure to make and go to all appointments, and call your doctor if you are having problems. It's also a good idea to know your test results and keep a list of the medicines you take. How can you care for yourself at home? · Include fruits, leafy green vegetables, beans, and whole grains in your diet each day. These foods are high in fiber. · Take a fiber supplement, such as Citrucel or Metamucil, every day if needed. Read and follow all instructions on the label. · Drink plenty of fluids, enough so that your urine is light yellow or clear like water. If you have kidney, heart, or liver disease and have to limit fluids, talk with your doctor before you increase the amount of fluids you drink. · Get at least 30 minutes of exercise on most days of the week. Walking is a good choice.  You also may want to do other activities, such as running, swimming, cycling, or playing tennis or team sports. · Cut out foods that cause gas, pain, or other symptoms. When should you call for help? Call your doctor now or seek immediate medical care if: 
· You have belly pain. · You pass maroon or very bloody stools. · You have a fever. · You have nausea and vomiting. · You have unusual changes in your bowel movements or abdominal swelling. · You have burning pain when you urinate. · You have abnormal vaginal discharge. · You have shoulder pain. · You have cramping pain that does not get better when you have a bowel movement or pass gas. · You pass gas or stool from your urethra while urinating. Watch closely for changes in your health, and be sure to contact your doctor if you have any problems. Where can you learn more? Go to http://gloria-atul.info/. Enter J983 in the search box to learn more about \"Diverticulosis: Care Instructions. \" Current as of: August 9, 2016 Content Version: 11.3 © 9201-9070 [employer]. Care instructions adapted under license by [employer] (which disclaims liability or warranty for this information). If you have questions about a medical condition or this instruction, always ask your healthcare professional. [address] any warranty or liability for your use of this information.

## 2017-09-29 LAB
ANION GAP SERPL CALC-SCNC: 16 MMOL/L (ref 7–16)
APPEARANCE UR: CLEAR
BILIRUB UR QL: NEGATIVE
BUN SERPL-MCNC: 7 MG/DL (ref 8–23)
CALCIUM SERPL-MCNC: 8 MG/DL (ref 8.3–10.4)
CHLORIDE SERPL-SCNC: 110 MMOL/L (ref 98–107)
CO2 SERPL-SCNC: 17 MMOL/L (ref 21–32)
COLOR UR: YELLOW
CREAT SERPL-MCNC: 0.58 MG/DL (ref 0.6–1)
ERYTHROCYTE [DISTWIDTH] IN BLOOD BY AUTOMATED COUNT: 13.1 % (ref 11.9–14.6)
EST. AVERAGE GLUCOSE BLD GHB EST-MCNC: 103 MG/DL
GLUCOSE BLD STRIP.AUTO-MCNC: 109 MG/DL (ref 65–100)
GLUCOSE BLD STRIP.AUTO-MCNC: 66 MG/DL (ref 65–100)
GLUCOSE BLD STRIP.AUTO-MCNC: 68 MG/DL (ref 65–100)
GLUCOSE BLD STRIP.AUTO-MCNC: 80 MG/DL (ref 65–100)
GLUCOSE BLD STRIP.AUTO-MCNC: 97 MG/DL (ref 65–100)
GLUCOSE SERPL-MCNC: 57 MG/DL (ref 65–100)
GLUCOSE UR STRIP.AUTO-MCNC: NEGATIVE MG/DL
HBA1C MFR BLD: 5.2 % (ref 4.8–6)
HCT VFR BLD AUTO: 29.6 % (ref 35.8–46.3)
HCT VFR BLD AUTO: 30.1 % (ref 35.8–46.3)
HEMOCCULT STL QL: NEGATIVE
HGB BLD-MCNC: 10.1 G/DL (ref 11.7–15.4)
HGB BLD-MCNC: 9.7 G/DL (ref 11.7–15.4)
HGB UR QL STRIP: NEGATIVE
KETONES UR QL STRIP.AUTO: NEGATIVE MG/DL
LEUKOCYTE ESTERASE UR QL STRIP.AUTO: NEGATIVE
MCH RBC QN AUTO: 28 PG (ref 26.1–32.9)
MCHC RBC AUTO-ENTMCNC: 32.2 G/DL (ref 31.4–35)
MCV RBC AUTO: 87 FL (ref 79.6–97.8)
NITRITE UR QL STRIP.AUTO: NEGATIVE
PH UR STRIP: 6 [PH] (ref 5–9)
PLATELET # BLD AUTO: 451 K/UL (ref 150–450)
PMV BLD AUTO: 9.3 FL (ref 10.8–14.1)
POTASSIUM SERPL-SCNC: 3.3 MMOL/L (ref 3.5–5.1)
PROT UR STRIP-MCNC: NEGATIVE MG/DL
RBC # BLD AUTO: 3.46 M/UL (ref 4.05–5.25)
SODIUM SERPL-SCNC: 143 MMOL/L (ref 136–145)
SP GR UR REFRACTOMETRY: 1.01 (ref 1–1.02)
UROBILINOGEN UR QL STRIP.AUTO: 0.2 EU/DL (ref 0.2–1)
WBC # BLD AUTO: 6 K/UL (ref 4.3–11.1)

## 2017-09-29 PROCEDURE — 74011250636 HC RX REV CODE- 250/636: Performed by: INTERNAL MEDICINE

## 2017-09-29 PROCEDURE — 84681 ASSAY OF C-PEPTIDE: CPT | Performed by: INTERNAL MEDICINE

## 2017-09-29 PROCEDURE — 85027 COMPLETE CBC AUTOMATED: CPT | Performed by: INTERNAL MEDICINE

## 2017-09-29 PROCEDURE — 82962 GLUCOSE BLOOD TEST: CPT

## 2017-09-29 PROCEDURE — 81003 URINALYSIS AUTO W/O SCOPE: CPT | Performed by: INTERNAL MEDICINE

## 2017-09-29 PROCEDURE — 77010033678 HC OXYGEN DAILY

## 2017-09-29 PROCEDURE — 74011250637 HC RX REV CODE- 250/637: Performed by: INTERNAL MEDICINE

## 2017-09-29 PROCEDURE — 80048 BASIC METABOLIC PNL TOTAL CA: CPT | Performed by: INTERNAL MEDICINE

## 2017-09-29 PROCEDURE — 82272 OCCULT BLD FECES 1-3 TESTS: CPT | Performed by: HOSPITALIST

## 2017-09-29 PROCEDURE — 36415 COLL VENOUS BLD VENIPUNCTURE: CPT | Performed by: INTERNAL MEDICINE

## 2017-09-29 PROCEDURE — 74011000258 HC RX REV CODE- 258: Performed by: INTERNAL MEDICINE

## 2017-09-29 PROCEDURE — 94760 N-INVAS EAR/PLS OXIMETRY 1: CPT

## 2017-09-29 PROCEDURE — 99218 HC RM OBSERVATION: CPT

## 2017-09-29 PROCEDURE — 83525 ASSAY OF INSULIN: CPT | Performed by: INTERNAL MEDICINE

## 2017-09-29 PROCEDURE — C9113 INJ PANTOPRAZOLE SODIUM, VIA: HCPCS | Performed by: INTERNAL MEDICINE

## 2017-09-29 PROCEDURE — 74011000250 HC RX REV CODE- 250: Performed by: INTERNAL MEDICINE

## 2017-09-29 PROCEDURE — 85018 HEMOGLOBIN: CPT | Performed by: INTERNAL MEDICINE

## 2017-09-29 PROCEDURE — 83036 HEMOGLOBIN GLYCOSYLATED A1C: CPT | Performed by: INTERNAL MEDICINE

## 2017-09-29 RX ORDER — POTASSIUM CHLORIDE 20 MEQ/1
40 TABLET, EXTENDED RELEASE ORAL
Status: COMPLETED | OUTPATIENT
Start: 2017-09-29 | End: 2017-09-29

## 2017-09-29 RX ORDER — SODIUM BICARBONATE 650 MG/1
650 TABLET ORAL 2 TIMES DAILY
Status: COMPLETED | OUTPATIENT
Start: 2017-09-29 | End: 2017-09-29

## 2017-09-29 RX ORDER — DEXTROSE MONOHYDRATE AND SODIUM CHLORIDE 5; .45 G/100ML; G/100ML
75 INJECTION, SOLUTION INTRAVENOUS CONTINUOUS
Status: DISCONTINUED | OUTPATIENT
Start: 2017-09-29 | End: 2017-10-02

## 2017-09-29 RX ORDER — ZOLPIDEM TARTRATE 5 MG/1
5 TABLET ORAL
Status: DISCONTINUED | OUTPATIENT
Start: 2017-09-29 | End: 2017-10-03 | Stop reason: HOSPADM

## 2017-09-29 RX ORDER — SODIUM BICARBONATE 1 MEQ/ML
50 SYRINGE (ML) INTRAVENOUS ONCE
Status: DISCONTINUED | OUTPATIENT
Start: 2017-09-29 | End: 2017-09-29

## 2017-09-29 RX ADMIN — HEPARIN SODIUM 5000 UNITS: 5000 INJECTION, SOLUTION INTRAVENOUS; SUBCUTANEOUS at 15:47

## 2017-09-29 RX ADMIN — SODIUM BICARBONATE 650 MG TABLET 650 MG: at 18:03

## 2017-09-29 RX ADMIN — ONDANSETRON 4 MG: 4 TABLET, ORALLY DISINTEGRATING ORAL at 18:14

## 2017-09-29 RX ADMIN — DEXTROSE MONOHYDRATE AND SODIUM CHLORIDE 75 ML/HR: 5; .45 INJECTION, SOLUTION INTRAVENOUS at 17:00

## 2017-09-29 RX ADMIN — SODIUM CHLORIDE 40 MG: 9 INJECTION INTRAMUSCULAR; INTRAVENOUS; SUBCUTANEOUS at 20:40

## 2017-09-29 RX ADMIN — FAMOTIDINE 20 MG: 20 TABLET ORAL at 00:23

## 2017-09-29 RX ADMIN — SODIUM BICARBONATE 650 MG TABLET 650 MG: at 11:15

## 2017-09-29 RX ADMIN — HEPARIN SODIUM 5000 UNITS: 5000 INJECTION, SOLUTION INTRAVENOUS; SUBCUTANEOUS at 00:23

## 2017-09-29 RX ADMIN — ACETAMINOPHEN 650 MG: 325 TABLET, FILM COATED ORAL at 00:26

## 2017-09-29 RX ADMIN — Medication 10 ML: at 00:23

## 2017-09-29 RX ADMIN — POTASSIUM CHLORIDE 40 MEQ: 20 TABLET, EXTENDED RELEASE ORAL at 09:58

## 2017-09-29 RX ADMIN — ACETAMINOPHEN 650 MG: 325 TABLET, FILM COATED ORAL at 20:45

## 2017-09-29 RX ADMIN — Medication 10 ML: at 05:51

## 2017-09-29 RX ADMIN — FAMOTIDINE 20 MG: 20 TABLET ORAL at 18:03

## 2017-09-29 RX ADMIN — ACETAMINOPHEN 650 MG: 325 TABLET, FILM COATED ORAL at 08:20

## 2017-09-29 RX ADMIN — ZOLPIDEM TARTRATE 5 MG: 5 TABLET ORAL at 23:26

## 2017-09-29 RX ADMIN — FAMOTIDINE 20 MG: 20 TABLET ORAL at 08:20

## 2017-09-29 RX ADMIN — Medication 10 ML: at 23:26

## 2017-09-29 RX ADMIN — HEPARIN SODIUM 5000 UNITS: 5000 INJECTION, SOLUTION INTRAVENOUS; SUBCUTANEOUS at 08:22

## 2017-09-29 RX ADMIN — SODIUM CHLORIDE 125 ML/HR: 900 INJECTION, SOLUTION INTRAVENOUS at 00:23

## 2017-09-29 NOTE — PROGRESS NOTES
Bright red blood found in pts stool mixed with toilet water. A hat has been placed for further stool samples. Paged attending doctor to notify.

## 2017-09-29 NOTE — INTERDISCIPLINARY ROUNDS
Interdisciplinary team rounds were held 9/29/2017 with the following team members:Care Management, Nursing, Pharmacy, Physical Therapy and Physician. Plan of care discussed. See clinical pathway and/or care plan for interventions and desired outcomes. Anticipating discharge to home today versus tomorrow pending labs.

## 2017-09-29 NOTE — H&P
Hospitalist H&P/Consult Note     Admit Date:  2017  3:15 PM   Name:  Jeni Prader   Age:  79 y.o.  :  1950   MRN:  [id number]   PCP:  Not On File [facility]  Treatment Team: Attending Provider: Yeyo Ford MD; Primary Nurse: Josiah Cramer RN    HPI:   79years old female presented to the hospital complaining of worsening nausea and vomiting. Patient stated  Symptoms started after she had her gallbladder removed at outside facility 2 weeks. Patient stated she cannot keep anything \"down in her stomach\" due to nausea and vomiting. Patient noted she had abdominal pain but has been improving since surgery. Patient described the pain as diffuse but worse at the epigastric region. Patient reported having a CT scan of her belly that was normal at outside facility. Patient went to PCP office who recommended to follow up at ED for dehydration due to tahycardia. Patient denies fever, chest pain or leg swelling. 10 systems reviewed and negative except as noted in HPI. Past Medical History:   Diagnosis Date    Hypertension     Stroke [facility])       Past Surgical History:   Procedure Laterality Date    HX CHOLECYSTECTOMY      HX GYN      hysterectomy    HX HEENT      left ear surgery      Prior to Admission Medications   Prescriptions Last Dose Informant Patient Reported? Taking? Cetirizine (ZYRTEC) 10 mg cap   Yes No   Sig: Take  by mouth. HYDROcodone-acetaminophen (NORCO) 5-325 mg per tablet   No No   Sig: Take 1 Tab by mouth every six (6) hours as needed. Max Daily Amount: 4 Tabs. PARoxetine (PAXIL) 10 mg tablet   Yes No   Sig: Take 10 mg by mouth daily. aspirin (ASPIRIN) 325 mg tablet   No No   Sig: Take 1 Tab by mouth daily. lisinopril (PRINIVIL, ZESTRIL) 20 mg tablet   No No   Sig: Take 1 Tab by mouth daily. montelukast (SINGULAIR) 10 mg tablet   Yes No   Sig: Take 10 mg by mouth daily. pravastatin (PRAVACHOL) 40 mg tablet   No No   Sig: Take 1 Tab by mouth nightly. Facility-Administered Medications: None     No Known Allergies   Social History   Substance Use Topics    Smoking status: Never Smoker    Smokeless tobacco: Not on file    Alcohol use No      History reviewed. No pertinent family history. Immunization History   Administered Date(s) Administered    TB Skin Test (PPD) Intradermal 10/28/2015       Objective:   Patient Vitals for the past 24 hrs:   Temp Pulse Resp BP SpO2   09/28/17 1828 - - - - 96 %   09/28/17 1816 - - - 114/64 98 %   09/28/17 1757 - - - 112/77 97 %   09/28/17 1737 - - - 115/64 97 %   09/28/17 1717 - - - 117/68 99 %   09/28/17 1656 - - - 111/77 98 %   09/28/17 1636 - - - 113/70 98 %   09/28/17 1617 - - - 124/64 95 %   09/28/17 1611 - - - - 97 %   09/28/17 1544 - (!) 111 - - 97 %   09/28/17 1505 97.8 °F (36.6 °C) (!) 140 26 102/78 97 %     Oxygen Therapy  O2 Sat (%): 96 % (09/28/17 1828)  Pulse via Oximetry: 101 beats per minute (09/28/17 1828)  O2 Device: Room air (09/28/17 1611)  No intake or output data in the 24 hours ending 09/28/17 2126    Physical Exam:  General:    Well nourished. Alert. Eyes:   Normal sclera. Extraocular movements intact. ENT:  Normocephalic, atraumatic. Moist mucous membranes  CV:   RRR. No murmur, rub, or gallop. Lungs:  CTAB. No wheezing, rhonchi, or rales. Abdomen: Mild diffuse tenderness. . Well healed surgical scar with no erythema or drainage. Soft, nondistended. Bowel sounds normal.   Extremities: Warm and dry. No cyanosis or edema. Neurologic: CN II-XII grossly intact. Sensation intact. Skin:     No rashes or jaundice. No wounds. Psych:  Normal mood and affect. I reviewed the labs, imaging, EKGs, telemetry, and other studies done this admission.   Data Review:   Recent Results (from the past 24 hour(s))   CBC WITH AUTOMATED DIFF    Collection Time: 09/28/17  3:16 PM   Result Value Ref Range    WBC 8.6 4.3 - 11.1 K/uL    RBC 4.38 4.05 - 5.25 M/uL    HGB 12.6 11.7 - 15.4 g/dL    HCT 36.6 35.8 - 46.3 %    MCV 83.6 79.6 - 97.8 FL    MCH 28.8 26.1 - 32.9 PG    MCHC 34.4 31.4 - 35.0 g/dL    RDW 12.9 11.9 - 14.6 %    PLATELET 036 (H) 185 - 450 K/uL    MPV 9.0 (L) 10.8 - 14.1 FL    DF AUTOMATED      NEUTROPHILS 52 43 - 78 %    LYMPHOCYTES 30 13 - 44 %    MONOCYTES 10 4.0 - 12.0 %    EOSINOPHILS 7 0.5 - 7.8 %    BASOPHILS 1 0.0 - 2.0 %    IMMATURE GRANULOCYTES 0.4 0.0 - 5.0 %    ABS. NEUTROPHILS 4.5 1.7 - 8.2 K/UL    ABS. LYMPHOCYTES 2.6 0.5 - 4.6 K/UL    ABS. MONOCYTES 0.9 0.1 - 1.3 K/UL    ABS. EOSINOPHILS 0.6 0.0 - 0.8 K/UL    ABS. BASOPHILS 0.0 0.0 - 0.2 K/UL    ABS. IMM. GRANS. 0.0 0.0 - 0.5 K/UL   METABOLIC PANEL, COMPREHENSIVE    Collection Time: 09/28/17  3:16 PM   Result Value Ref Range    Sodium 139 136 - 145 mmol/L    Potassium 4.0 3.5 - 5.1 mmol/L    Chloride 102 98 - 107 mmol/L    CO2 24 21 - 32 mmol/L    Anion gap 13 7 - 16 mmol/L    Glucose 72 65 - 100 mg/dL    BUN 9 8 - 23 MG/DL    Creatinine 0.81 0.6 - 1.0 MG/DL    GFR est AA >60 >60 ml/min/1.73m2    GFR est non-AA >60 >60 ml/min/1.73m2    Calcium 9.1 8.3 - 10.4 MG/DL    Bilirubin, total 0.9 0.2 - 1.1 MG/DL    ALT (SGPT) 22 12 - 65 U/L    AST (SGOT) 52 (H) 15 - 37 U/L    Alk.  phosphatase 91 50 - 136 U/L    Protein, total 8.2 6.3 - 8.2 g/dL    Albumin 3.0 (L) 3.2 - 4.6 g/dL    Globulin 5.2 (H) 2.3 - 3.5 g/dL    A-G Ratio 0.6 (L) 1.2 - 3.5     LIPASE    Collection Time: 09/28/17  3:16 PM   Result Value Ref Range    Lipase 625 (H) 73 - 393 U/L   PROCALCITONIN    Collection Time: 09/28/17  3:16 PM   Result Value Ref Range    Procalcitonin <0.1 ng/mL   POC TROPONIN-I    Collection Time: 09/28/17  3:20 PM   Result Value Ref Range    Troponin-I (POC) 0 (L) 0.02 - 0.05 ng/ml   EKG, 12 LEAD, INITIAL    Collection Time: 09/28/17  3:24 PM   Result Value Ref Range    Ventricular Rate 109 BPM    Atrial Rate 109 BPM    P-R Interval 124 ms    QRS Duration 86 ms    Q-T Interval 294 ms    QTC Calculation (Bezet) 395 ms    Calculated P Axis 64 degrees Calculated R Axis 64 degrees    Calculated T Axis 70 degrees    Diagnosis       !! AGE AND GENDER SPECIFIC ECG ANALYSIS !! Sinus tachycardia  Nonspecific T wave abnormality  Abnormal ECG  When compared with ECG of 26-OCT-2015 15:06,  Nonspecific T wave abnormality now evident in Lateral leads  QT has shortened  Confirmed by DIANA RODARTE (), MYNOR GIRON ([id number]) on 9/28/2017 6:07:32 PM     POC LACTIC ACID    Collection Time: 09/28/17  4:13 PM   Result Value Ref Range    Lactic Acid (POC) 1.6 0.5 - 1.9 mmol/L   D DIMER    Collection Time: 09/28/17  4:45 PM   Result Value Ref Range    D DIMER 1.83 (HH) <0.55 ug/ml(FEU)   TSH 3RD GENERATION    Collection Time: 09/28/17  4:45 PM   Result Value Ref Range    TSH 2.080 0.358 - 3.740 uIU/mL   T4, FREE    Collection Time: 09/28/17  4:45 PM   Result Value Ref Range    T4, Free 1.7 (H) 0.78 - 1.46 NG/DL       Imaging Studies:  CXR Results  (Last 48 hours)               09/28/17 1608  XR ABD ACUTE W 1 V CHEST Final result    Impression:  IMPRESSION: Negative for free air, ileus or obstruction. Narrative:  ACUTE ABDOMINAL SERIES, 3 VIEWS. HISTORY: Vomiting. Recent cholecystectomy. .       TECHNIQUE: AP view of the chest, flat and upright views of the abdomen on a   total of 4 images. COMPARISON: 26 October 2015. FINDINGS: The bowel gas pattern is unremarkable. No abnormally dilated bowel   loops. No free air under the hemidiaphragm. No radiopaque calculi. The lung   bases are clear. Surgical changes right upper quadrant. Electronic device over   the chest.               CT Results  (Last 48 hours)               09/28/17 1853  CT CHEST W CONT Final result    Impression:  IMPRESSION:    1. No CT evidence of pulmonary embolus. 2.  Small nodules in the right lung base, likely benign. If the patient has a   smoking history or other risk factors, consider 6 month follow-up.                Narrative:  Chest CT       INDICATION:  Recent gallbladder surgery, now tachycardia and shortness of breath       Multiple axial images were obtained through the chest during intravenous   infusion of 100mL of Isovue 370. Coronal reformats were also evaluated. Radiation dose reduction techniques were used for this study: All CT scans   performed at this facility use one or all of the following: Automated exposure   control, adjustment of the mA and/or kVp according to patient's size, iterative   reconstruction. FINDINGS: There is normal enhancement of the major pulmonary vessels. There is   no CT evidence of pulmonary embolus. There is also normal enhancement of the   aorta. There is no dissection or aneurysm. There is a 3 m nodule in the right lower lobe and a 2 mm nodule in the right   middle lobe. There are no significant infiltrates. There are no effusions. There is no significant mediastinal or axillary adenopathy. There are no bony   lesions. Limited imaging of the upper abdomen is also unremarkable. Assessment and Plan:     Hospital Problems as of 9/28/2017  Never Reviewed          Codes Class Noted - Resolved POA    Intractable nausea and vomiting ICD-10-CM: R11.2  ICD-9-CM: 536.2  9/28/2017 - Present Unknown            -Intractable nausea and vomiting/ dehydration  ? Secondary to post surgical changes on RUQ  Patient is tachycardic at ED  Chart reviewed showing a Nuclear scan on 09/2017 with no bile leak or CBD obstruction  CT abdomen done on same day with no acute disease reported  Patient is afebrile without WBC  CT chest did not showed PE  Slightly elevated lipase likely secondary to recent cholecystectomy     Plan  Start gentle hydration with NS  Symptomatic management with antiemetic and pain management  US to evaluate RUQ.  Not CT abdomen to avoid contrast induced nephropathy after CTA  Clear liquids diet  Pepcid trial for epigastric discomfort     -Pulmonary nodule  To be follow up as outpatient        Estimated LOS:  1 night    Signed:   Sarah Melgoza MD

## 2017-09-29 NOTE — PROGRESS NOTES
Problem: Nutrition Deficit  Goal: *Optimize nutritional status  Nutrition  Reason for assessment: Referral received from nursing admission Malnutrition Screening Tool for recently lost 2-13# without trying and eating poorly due to decreased appetite. Assessment:   Diet order(s): Clear Liquids  Food/Nutrition Patient History:  The patient presents with a h/o nausea and vomiting for the past 2 weeks. Reports that she had a gallbladder surgery about 2 weeks ago and has been with nausea and vomiting since her surgery. States that she was attempting foods such as oatmeal, hot dogs and cheeseburgers. The patient denies any other po difficulties at this time. The patient is unsure amount how much weight loss she has recently had. States that her UBW is around 132-133 lbs and that when she weighed herself yesterday at her MD visit she weighed 122 lbs. Weight history in the EMR cannot be verified as accurate due to unknown weight source (pt stated vs estimated vs measured). WT / BMI WEIGHT   9/28/2017 132 lb   10/26/2015 132 lb   The weight source of the current EMR is unknown. RD ordered for the patient to be weighed to accurately assess weight loss. Anthropometrics:Height: 5' 2\" (157.5 cm),  Weight: 59.9 kg (132 lb), Weight source: unstated, Body mass index is 24.14 kg/(m^2). BMI class of normal weight. Macronutrient needs:  EER:  5716-8388 kcal /day (25-30 kcal/kg listed BW)  EPR:  50-60 grams protein/day (1-1.2 grams/kg IBW)  Intake/Comparative Standards: Current Clear Liquid diet does not meet estimated needs at this time. Nutrition Diagnosis: Inadequate oral intake related to altered gi function as evidenced by patient with nausea and vomiting x2 weeks and pt reported weight loss of 10 lbs over the past 2 weeks. Intervention:  Meals and snacks: Advance diet as medically appropriate. Nutrition Supplement Therapy:  Add ensure clear while pt is on clear liquids   Nutrition Discharge Plan: too soon to be determined. Chari Prajapati [address], [address] [place] [address], [phone]

## 2017-09-29 NOTE — PROGRESS NOTES
Pt c/o difficulty breathing at this time; daughter states pt was suppose to have a sleep study done prior to admission for possible sleep apnea. Family hx of sleep apnea. SAT 95% on RA at this time; HOB elevated. Pt c/o difficlty breathing at 0416; placed on 2L nc at this time. No further complaints; pt resting/sleeping well while wearing nasal cannula. 5486- Daughter states pt is sleeping well and seems to be breathing much better with no c/o of difficulty breathing while wearing nasal cannula.

## 2017-09-29 NOTE — PROGRESS NOTES
Pt and daughter oriented to room and call light; verbalized understanding. Dual skin assessment completed with Ivan Hernández RN. Integumentary WNL with no pressure ulcers at this time. Bed L/L, SR up x2, call light and personal items within reach.

## 2017-09-29 NOTE — PROGRESS NOTES
All hourly rounds have been completed on pt. All pt needs are met at this time. Will continue to monitor pt throughout shift and give report to oncoming night shift nurse.

## 2017-09-29 NOTE — PROGRESS NOTES
Hospitalist Progress Note    2017  Admit Date: 2017  3:15 PM   NAME: Felicita Farooq   :  [id number]   MRN:  [id number]   Attending: Jesse Torres MD  PCP:  Not On File [facility]    SUBJECTIVE:    79years old female presented to the hospital complaining of worsening nausea and vomiting. Patient stated symptoms started after she had her gallbladder removed at outside facility 2 weeks. Patient went to PCP office who recommended to follow up at ED for dehydration due to tahycardia. She was started on IVF, clear liquid diet with addition of PPI and admitted to observation.  - pt reports improvement in abdominal pain but still w/ underlying nausea. Does report frequent headaches -non curently. Appears extremely sleepy during interview. Review of Systems negative with exception of pertinent positives noted above  PHYSICAL EXAM     Visit Vitals    /74 (BP 1 Location: Right arm, BP Patient Position: At rest)    Pulse 78    Temp 98.2 °F (36.8 °C)    Resp 18    Ht 5' 2\" (1.575 m)    Wt 59.9 kg (132 lb)    SpO2 100%    Breastfeeding No    BMI 24.14 kg/m2      Temp (24hrs), Av.2 °F (36.8 °C), Min:97.5 °F (36.4 °C), Max:99.6 °F (37.6 °C)    Oxygen Therapy  O2 Sat (%): 100 % (17 1128)  Pulse via Oximetry: 92 beats per minute (17 0834)  O2 Device: Nasal cannula (17 0834)  O2 Flow Rate (L/min): 2 l/min (17 0834)  FIO2 (%): 28 % (17 0612)  No intake or output data in the 24 hours ending 17 1337   General: No acute distress    Lungs:  CTA Bilaterally.    Heart:  Regular rate and rhythm,  No murmur, rub, or gallop  Abdomen: Soft, Non distended, Non tender, Positive bowel sounds  Extremities: No cyanosis, clubbing or edema  Neurologic:  No focal deficits    ASSESSMENT      Active Hospital Problems    Diagnosis Date Noted    Intractable nausea and vomiting 2017     Plan:  · Nausea/vomiting - slight clinical improvement but still severely nauseated. Cont prn zofran. RUQ US wnl. Elevated lipase presumably secondary to recent cholecystectomy  · Hypoglycemia - pt is a non diabetic but with BG of 57 this AM. Will ck cpeptide/insulin levels. · Headaches - not clearly associated with timing of nausea/vomiting. CT head August wnl. · Metabolic acidosis - presume r/t dehydration.  Cont ivf and add bicarb supplement x 2 doses  · Hypokalemia - replete and repeat in am w/ mag    DVT Prophylaxis: hep sq    dispo - likely home in AM  .    Signed By: Iris Neves,      September 29, 2017

## 2017-09-29 NOTE — PROGRESS NOTES
Pt threw up approx 300 ml of fluid, yellow in color. Fluid was watery with no chunks, no blood was seen. Pt was given order of Zofran. Will pass on info to oncoming nurse in report.

## 2017-09-29 NOTE — PROGRESS NOTES
Initial visit by  to convey care and concern and encourage patient that  services are available if desired. Offered prayer during the visit as requested. Provided business card for future reference.      Huy Collier 68  Board Certified

## 2017-09-30 LAB
ALBUMIN SERPL-MCNC: 2.5 G/DL (ref 3.2–4.6)
ALBUMIN/GLOB SERPL: 0.6 {RATIO} (ref 1.2–3.5)
ALP SERPL-CCNC: 80 U/L (ref 50–136)
ALT SERPL-CCNC: 14 U/L (ref 12–65)
AMYLASE SERPL-CCNC: 119 U/L (ref 25–115)
ANION GAP SERPL CALC-SCNC: 9 MMOL/L (ref 7–16)
APTT PPP: 31.3 SEC (ref 23.5–31.7)
AST SERPL-CCNC: 21 U/L (ref 15–37)
BILIRUB SERPL-MCNC: 0.6 MG/DL (ref 0.2–1.1)
BUN SERPL-MCNC: 3 MG/DL (ref 8–23)
C PEPTIDE SERPL-MCNC: 1.1 NG/ML (ref 1.1–4.4)
CALCIUM SERPL-MCNC: 8.4 MG/DL (ref 8.3–10.4)
CHLORIDE SERPL-SCNC: 109 MMOL/L (ref 98–107)
CO2 SERPL-SCNC: 24 MMOL/L (ref 21–32)
CREAT SERPL-MCNC: 0.56 MG/DL (ref 0.6–1)
ERYTHROCYTE [DISTWIDTH] IN BLOOD BY AUTOMATED COUNT: 13 % (ref 11.9–14.6)
GLOBULIN SER CALC-MCNC: 3.9 G/DL (ref 2.3–3.5)
GLUCOSE BLD STRIP.AUTO-MCNC: 84 MG/DL (ref 65–100)
GLUCOSE BLD STRIP.AUTO-MCNC: 84 MG/DL (ref 65–100)
GLUCOSE BLD STRIP.AUTO-MCNC: 86 MG/DL (ref 65–100)
GLUCOSE BLD STRIP.AUTO-MCNC: 99 MG/DL (ref 65–100)
GLUCOSE SERPL-MCNC: 87 MG/DL (ref 65–100)
HCT VFR BLD AUTO: 30 % (ref 35.8–46.3)
HGB BLD-MCNC: 10.2 G/DL (ref 11.7–15.4)
INR PPP: 1.2 (ref 0.9–1.2)
INSULIN SERPL-ACNC: 2.1 UIU/ML (ref 2.6–24.9)
LIPASE SERPL-CCNC: 697 U/L (ref 73–393)
MAGNESIUM SERPL-MCNC: 1.5 MG/DL (ref 1.8–2.4)
MCH RBC QN AUTO: 28.4 PG (ref 26.1–32.9)
MCHC RBC AUTO-ENTMCNC: 34 G/DL (ref 31.4–35)
MCV RBC AUTO: 83.6 FL (ref 79.6–97.8)
PLATELET # BLD AUTO: 431 K/UL (ref 150–450)
PMV BLD AUTO: 8.7 FL (ref 10.8–14.1)
POTASSIUM SERPL-SCNC: 3.5 MMOL/L (ref 3.5–5.1)
PROT SERPL-MCNC: 6.4 G/DL (ref 6.3–8.2)
PROTHROMBIN TIME: 13 SEC (ref 9.6–12)
RBC # BLD AUTO: 3.59 M/UL (ref 4.05–5.25)
SODIUM SERPL-SCNC: 142 MMOL/L (ref 136–145)
WBC # BLD AUTO: 4.7 K/UL (ref 4.3–11.1)

## 2017-09-30 PROCEDURE — 83690 ASSAY OF LIPASE: CPT | Performed by: INTERNAL MEDICINE

## 2017-09-30 PROCEDURE — 82150 ASSAY OF AMYLASE: CPT | Performed by: INTERNAL MEDICINE

## 2017-09-30 PROCEDURE — C9113 INJ PANTOPRAZOLE SODIUM, VIA: HCPCS | Performed by: INTERNAL MEDICINE

## 2017-09-30 PROCEDURE — 85610 PROTHROMBIN TIME: CPT | Performed by: INTERNAL MEDICINE

## 2017-09-30 PROCEDURE — 82962 GLUCOSE BLOOD TEST: CPT

## 2017-09-30 PROCEDURE — 83735 ASSAY OF MAGNESIUM: CPT | Performed by: INTERNAL MEDICINE

## 2017-09-30 PROCEDURE — 80053 COMPREHEN METABOLIC PANEL: CPT | Performed by: INTERNAL MEDICINE

## 2017-09-30 PROCEDURE — 36415 COLL VENOUS BLD VENIPUNCTURE: CPT | Performed by: INTERNAL MEDICINE

## 2017-09-30 PROCEDURE — 65270000029 HC RM PRIVATE

## 2017-09-30 PROCEDURE — 74011250637 HC RX REV CODE- 250/637: Performed by: INTERNAL MEDICINE

## 2017-09-30 PROCEDURE — 74011250636 HC RX REV CODE- 250/636: Performed by: INTERNAL MEDICINE

## 2017-09-30 PROCEDURE — 74011000258 HC RX REV CODE- 258: Performed by: INTERNAL MEDICINE

## 2017-09-30 PROCEDURE — 74011000250 HC RX REV CODE- 250: Performed by: INTERNAL MEDICINE

## 2017-09-30 PROCEDURE — 85730 THROMBOPLASTIN TIME PARTIAL: CPT | Performed by: INTERNAL MEDICINE

## 2017-09-30 PROCEDURE — 99218 HC RM OBSERVATION: CPT

## 2017-09-30 PROCEDURE — 85027 COMPLETE CBC AUTOMATED: CPT | Performed by: INTERNAL MEDICINE

## 2017-09-30 RX ADMIN — DEXTROSE MONOHYDRATE AND SODIUM CHLORIDE 75 ML/HR: 5; .45 INJECTION, SOLUTION INTRAVENOUS at 06:25

## 2017-09-30 RX ADMIN — SODIUM CHLORIDE 40 MG: 9 INJECTION INTRAMUSCULAR; INTRAVENOUS; SUBCUTANEOUS at 21:20

## 2017-09-30 RX ADMIN — ONDANSETRON 4 MG: 4 TABLET, ORALLY DISINTEGRATING ORAL at 08:38

## 2017-09-30 RX ADMIN — Medication 10 ML: at 21:23

## 2017-09-30 RX ADMIN — ACETAMINOPHEN 650 MG: 325 TABLET, FILM COATED ORAL at 14:43

## 2017-09-30 RX ADMIN — ZOLPIDEM TARTRATE 5 MG: 5 TABLET ORAL at 22:30

## 2017-09-30 RX ADMIN — ONDANSETRON 4 MG: 4 TABLET, ORALLY DISINTEGRATING ORAL at 12:22

## 2017-09-30 RX ADMIN — Medication 10 ML: at 06:25

## 2017-09-30 RX ADMIN — ONDANSETRON 4 MG: 4 TABLET, ORALLY DISINTEGRATING ORAL at 21:20

## 2017-09-30 RX ADMIN — Medication 10 ML: at 13:38

## 2017-09-30 RX ADMIN — DEXTROSE MONOHYDRATE AND SODIUM CHLORIDE 75 ML/HR: 5; .45 INJECTION, SOLUTION INTRAVENOUS at 21:23

## 2017-09-30 RX ADMIN — SODIUM CHLORIDE 40 MG: 9 INJECTION INTRAMUSCULAR; INTRAVENOUS; SUBCUTANEOUS at 08:38

## 2017-09-30 NOTE — PROGRESS NOTES
Hospitalist Progress Note    2017  Admit Date: 2017  3:15 PM   NAME: Ericka Valencia   :     MRN:  [id number]   Attending: Natalia Mejia MD  PCP:  Not On File [facility]    SUBJECTIVE:    79years old female presented to the hospital complaining of worsening nausea and vomiting. Patient stated symptoms started after she had her gallbladder removed at outside facility 2 weeks. Patient went to PCP office who recommended to follow up at ED for dehydration due to tahycardia. She was started on IVF, clear liquid diet with addition of PPI and admitted to observation.  - pt reports improvement in abdominal pain but still w/ underlying nausea. Does report frequent headaches -non curently. Appears extremely sleepy during interview.  - feeling better but still vomiting with oral intake - vomitted after drinking juice this morning    Review of Systems negative with exception of pertinent positives noted above  PHYSICAL EXAM     Visit Vitals    /59    Pulse 86    Temp 98 °F (36.7 °C)    Resp 17    Ht 5' 2\" (1.575 m)    Wt 59.9 kg (132 lb)    SpO2 99%    Breastfeeding No    BMI 24.14 kg/m2      Temp (24hrs), Av.2 °F (36.8 °C), Min:98 °F (36.7 °C), Max:98.3 °F (36.8 °C)    Oxygen Therapy  O2 Sat (%): 99 % (17 1624)  Pulse via Oximetry: 92 beats per minute (17 0834)  O2 Device: Nasal cannula (17 0834)  O2 Flow Rate (L/min): 2 l/min (17 0834)  FIO2 (%): 28 % (17 0612)  No intake or output data in the 24 hours ending 17 1627   General: No acute distress    Lungs:  CTA Bilaterally.    Heart:  Regular rate and rhythm,  No murmur, rub, or gallop  Abdomen: Soft, Non distended, Non tender, Positive bowel sounds  Extremities: No cyanosis, clubbing or edema  Neurologic:  No focal deficits    ASSESSMENT      Active Hospital Problems    Diagnosis Date Noted    Intractable nausea and vomiting 2017     Plan:  · Nausea/vomiting - s/p recent amy. GI following. ?plans for endoscopy sun/mon  · Hypoglycemia - pt is a non diabetic but with BG of 57 this AM. Will ck cpeptide/insulin levels. - pending. Cont [id number]  · Headaches - not clearly associated with timing of nausea/vomiting. CT head August wnl. · Metabolic acidosis - resolved  Hypokalemia -resolved  DVT Prophylaxis: hep sq      .     Signed By: Lukas Nazario DO     September 30, 2017

## 2017-09-30 NOTE — PROGRESS NOTES
Pt had a vomiting episode with approximately 200 ml's of clear yellowish emesis. Zofran given. Pt resting at this time. Will continue to monitor.

## 2017-09-30 NOTE — CONSULTS
[facility] Consult Note       Referring Physician:  Dr Jimbo Briseno    Consult Date:  9/30/2017    Admit Date:  9/28/2017    Chief Complaint:  Hematochezia    Subjective:     History of Present Illness:  Patient is a 79 y.o. female with PMH of HTN, HLD, stroke, and depression, who is seen in consultation at the request of Dr. Jimbo Briseno for hematochezia. Pt reports that she was recently discharged from [facility] following cholecystectomy 9/15/17 and subsequent 10 day hospitalization for n/v following procedure. She has continued to have nausea, vomiting, and abdominal pain. She was evaluated by her PCP 9/28/17 and advised to come to ED for evaluation for tachycardia and hypotension. She had nuclear scan 9/2017 with no bile leak or CBD obstruction. CT Abdomen at that time revealed no acute disease. CT chest in ED with no PE seen. She did have slightly elevated lipase. She reports that she developed diarrhea yesterday. Additionally she had 2 episodes of passing BRB with very little to no fecal material. Heparin was held. Stool submitted for blood was negative on 9/29/17. She reports diarrhea this am with no BRB or melena. She continues to have RUQ/epigastric pain. She continues to have nausea and vomiting (1 episode of vomiting so far this am). She denies any frequent HB or GERD at home. She denies any constipation or diarrhea frequently. She reports remote history of hemorrhoids with pregnancy years ago. She reports recent colonoscopy 12/2016 at [facility] that revealed diverticulosis but no polyps. This was a repeat procedure with additional prep as her original colo 10/2016 was incomplete due to poor prep. She does have a history of colon polyps. She denies any prior EGD. HGB is currently 10.2 (HGB was 12.6 at admission). BUN 3.      PMH:  Past Medical History:   Diagnosis Date    Hypertension     Stroke ([address]        PSH:  Past Surgical History:   Procedure Laterality Date    HX CHOLECYSTECTOMY      HX GYN hysterectomy    HX HEENT      left ear surgery       Allergies:  No Known Allergies    Home Medications:  Prior to Admission medications    Medication Sig Start Date End Date Taking? Authorizing Provider   aspirin (ASPIRIN) 325 mg tablet Take 1 Tab by mouth daily. 10/28/15   Cirilo Smith MD   HYDROcodone-acetaminophen [facility]) 5-325 mg per tablet Take 1 Tab by mouth every six (6) hours as needed. Max Daily Amount: 4 Tabs. 10/28/15   Cirilo Smith MD   lisinopril (PRINIVIL, ZESTRIL) 20 mg tablet Take 1 Tab by mouth daily. 10/28/15   Cirilo Smith MD   pravastatin (PRAVACHOL) 40 mg tablet Take 1 Tab by mouth nightly. 10/28/15   Cirilo Smith MD   montelukast (SINGULAIR) 10 mg tablet Take 10 mg by mouth daily. Humberto Magaña MD   Cetirizine (ZYRTEC) 10 mg cap Take  by mouth. Humberto Magaña MD   PARoxetine (PAXIL) 10 mg tablet Take 10 mg by mouth daily. Humberto Magaña MD       Hospital Medications:  Current Facility-Administered Medications   Medication Dose Route Frequency    dextrose 5 % - 0.45% NaCl infusion  75 mL/hr IntraVENous CONTINUOUS    pantoprazole (PROTONIX) 40 mg in sodium chloride 0.9 % 10 mL injection  40 mg IntraVENous Q12H    zolpidem (AMBIEN) tablet 5 mg  5 mg Oral QHS PRN    sodium chloride (NS) flush 5-10 mL  5-10 mL IntraVENous Q8H    sodium chloride (NS) flush 5-10 mL  5-10 mL IntraVENous PRN    acetaminophen (TYLENOL) tablet 650 mg  650 mg Oral Q4H PRN    ondansetron (ZOFRAN ODT) tablet 4 mg  4 mg Oral Q4H PRN       Social History:  Social History   Substance Use Topics    Smoking status: Never Smoker    Smokeless tobacco: Not on file    Alcohol use No       Pt denies any history of drug use, blood transfusions, or tattoos. Family History:  History reviewed. No pertinent family history. Review of Systems:  A detailed 10 system ROS is obtained, with pertinent positives as listed above. All others are negative.     Diet:  GI soft    Objective: Physical Exam:  Vitals:  Visit Vitals    /71    Pulse 78    Temp 98.2 °F (36.8 °C)    Resp 19    Ht 5' 2\" (1.575 m)    Wt 59.9 kg (132 lb)    SpO2 96%    Breastfeeding No    BMI 24.14 kg/m2     Gen:  Pt is alert, cooperative, no acute distress  Skin:  Extremities and face reveal no rashes. HEENT: Sclerae anicteric. Extra-occular muscles are intact. No oral ulcers. No abnormal pigmentation of the lips. The neck is supple. Cardiovascular: Regular rate and rhythm. No murmurs, gallops, or rubs. Respiratory:  Comfortable breathing with no accessory muscle use. Clear breath sounds anteriorly with no wheezes, rales, or rhonchi. GI:  Abdomen nondistended, soft, and mild tenderness epigastric and RUQ. Normal active bowel sounds. No enlargement of the liver or spleen. No masses palpable. Rectal:  Deferred  Musculoskeletal:  No pitting edema of the lower legs. Neurological:  Gross memory appears intact. Patient is alert and oriented. Psychiatric:  Mood appears appropriate with judgement intact. Lymphatic:  No cervical or supraclavicular adenopathy.     Laboratory:    Recent Labs      09/30/17   0636  09/29/17   1953  09/29/17   0453  09/28/17   1516   WBC  4.7   --   6.0  8.6   HGB  10.2*  10.1*  9.7*  12.6   HCT  30.0*  29.6*  30.1*  36.6   PLT  431   --   451*  627*   MCV  83.6   --   87.0  83.6   NA  142   --   143  139   K  3.5   --   3.3*  4.0   CL  109*   --   110*  102   CO2  24   --   17*  24   BUN  3*   --   7*  9   CREA  0.56*   --   0.58*  0.81   CA  8.4   --   8.0*  9.1   MG  1.5*   --    --    --    GLU  87   --   57*  72   AP  80   --    --   91   SGOT  21   --    --   52*   ALT  14   --    --   22   TBILI  0.6   --    --   0.9   ALB  2.5*   --    --   3.0*   TP  6.4   --    --   8.2   LPSE   --    --    --   625*      Chest CT 9/28/17   INDICATION:  Recent gallbladder surgery, now tachycardia and shortness of breath   Multiple axial images were obtained through the chest during intravenous  infusion of 100mL of Isovue 370. Coronal reformats were also evaluated. Radiation dose reduction techniques were used for this study: All CT scans  performed at this facility use one or all of the following: Automated exposure  control, adjustment of the mA and/or kVp according to patient's size, iterative  reconstruction.   FINDINGS: There is normal enhancement of the major pulmonary vessels. There is  no CT evidence of pulmonary embolus. There is also normal enhancement of the  aorta. There is no dissection or aneurysm.   There is a 3 m nodule in the right lower lobe and a 2 mm nodule in the right  middle lobe. There are no significant infiltrates. There are no effusions. There is no significant mediastinal or axillary adenopathy. There are no bony  lesions. Limited imaging of the upper abdomen is also unremarkable.   IMPRESSION  IMPRESSION:   1. No CT evidence of pulmonary embolus. 2.  Small nodules in the right lung base, likely benign. If the patient has a  smoking history or other risk factors, consider 6 month follow-up. ACUTE ABDOMINAL SERIES, 3 VIEWS.  9/28/17   HISTORY: Vomiting. Recent cholecystectomy. .   TECHNIQUE: AP view of the chest, flat and upright views of the abdomen on a  total of 4 images.   COMPARISON: 26 October 2015.   FINDINGS: The bowel gas pattern is unremarkable. No abnormally dilated bowel  loops. No free air under the hemidiaphragm. No radiopaque calculi. The lung  bases are clear. Surgical changes right upper quadrant. Electronic device over  the chest.   IMPRESSION  IMPRESSION: Negative for free air, ileus or obstruction. Right Upper Quadrant  Ultrasound 9/28/17   INDICATION:  Recent cholecystectomy, shortness of breath and vomiting   FINDINGS: There are no discrete lesions in the visualized portions of the liver  or pancreas. There is no significant bile duct dilatation. The common bile duct  measures 9 mm. The gallbladder is absent.   No fluid collection is seen in the  gallbladder fossa.   The right kidney measures 12.4 cm in length. There is no hydronephrosis. There  is no evidence of a renal mass. There is no ascites.    IMPRESSION  IMPRESSION: Unremarkable right upper quadrant ultrasound         Assessment:     Principal Problem:    Intractable nausea and vomiting (9/28/2017)    Patient is a 79 y.o. female with PMH of HTN, HLD, stroke, and depression, who is seen in consultation at the request of Dr. Alexander Allison for hematochezia. She underwent cholecystectomy 9/15/17 and has had nausea, vomiting, and abdominal pain. She was discharged from [facility] 9/25/17 and was admitted at [facility] 9/28/17 for tachycardia and hypotension. She has had 2 episodes of BRBPR, but stool for occult blood was negative. She had episode of diarrhea this am without BRB or melena. She reports recent colonoscopy with findings of diverticulosis. She has never had EGD. She is mildly tender on exam epigastric pain and RUQ. Differential includes: diverticular bleed, hemorrhoidal bleed, rapid transit upper GIB. Plan:     -Monitor HGB and transfuse as needed  -Monitor for signs of GIB  -Would recommend EGD for further evaluation of persistent nausea and vomiting-timing to be determined  -Consider colonoscopy if additional episodes of BRBPR--pt reports needing additional prep at her last colonoscopy  -Continue to follow    Patient is seen and examined in collaboration with Dr. Rashmi Fonseca. Assessment and plan as per Dr. Rashmi Fonseca. [facility]    Patient interviewed and examined. Agree with above. Brown liquid stools now without blood or melena. Description and clinical course c/w outlet type bleeding. Emesis after eating without blood or coffee grounds. Increased nausea and abdominal / chest pain after eating. Reports same symptoms requiring hospitalization at [facility] prior to Cholecystectomy.   [facility] reviewed by [facility] everywhere\" - Hgb 10.3 on last discharge. Decreased Hgb after admission likely from hemodilution with Hgb 12.3 from hemoconcentration. Lipase 625 on 9/28 with no follow-up. Will repeat. Discussed assessment and plans with patient and  Daughter. Possible need for EGD +/- flex sig or Colonoscopy discussed. Possible need for further evaluation of pancreas discussed. Will change to clear liquid diet and NPO after 5 am till evaluated in am.  Dr. Avinash Garcia - inpatient rounding team to follow - possible endoscopic evaluation Sunday or Monday. The patient has been counseled on the technique, risks (medication reaction, gastrointestinal bleeding, perforation and infections), and benefits of EGD, and agrees to proceed. Vargas Goodwin MD

## 2017-10-01 LAB
ANION GAP SERPL CALC-SCNC: 10 MMOL/L (ref 7–16)
BUN SERPL-MCNC: 2 MG/DL (ref 8–23)
CALCIUM SERPL-MCNC: 8.6 MG/DL (ref 8.3–10.4)
CHLORIDE SERPL-SCNC: 109 MMOL/L (ref 98–107)
CO2 SERPL-SCNC: 25 MMOL/L (ref 21–32)
CREAT SERPL-MCNC: 0.62 MG/DL (ref 0.6–1)
ERYTHROCYTE [DISTWIDTH] IN BLOOD BY AUTOMATED COUNT: 13 % (ref 11.9–14.6)
GLUCOSE BLD STRIP.AUTO-MCNC: 102 MG/DL (ref 65–100)
GLUCOSE BLD STRIP.AUTO-MCNC: 83 MG/DL (ref 65–100)
GLUCOSE BLD STRIP.AUTO-MCNC: 86 MG/DL (ref 65–100)
GLUCOSE BLD STRIP.AUTO-MCNC: 97 MG/DL (ref 65–100)
GLUCOSE SERPL-MCNC: 91 MG/DL (ref 65–100)
HCT VFR BLD AUTO: 32.8 % (ref 35.8–46.3)
HGB BLD-MCNC: 11.2 G/DL (ref 11.7–15.4)
MCH RBC QN AUTO: 28.5 PG (ref 26.1–32.9)
MCHC RBC AUTO-ENTMCNC: 34.1 G/DL (ref 31.4–35)
MCV RBC AUTO: 83.5 FL (ref 79.6–97.8)
PLATELET # BLD AUTO: 466 K/UL (ref 150–450)
PMV BLD AUTO: 9.1 FL (ref 10.8–14.1)
POTASSIUM SERPL-SCNC: 3.1 MMOL/L (ref 3.5–5.1)
RBC # BLD AUTO: 3.93 M/UL (ref 4.05–5.25)
SODIUM SERPL-SCNC: 144 MMOL/L (ref 136–145)
WBC # BLD AUTO: 3.9 K/UL (ref 4.3–11.1)

## 2017-10-01 PROCEDURE — 80048 BASIC METABOLIC PNL TOTAL CA: CPT | Performed by: INTERNAL MEDICINE

## 2017-10-01 PROCEDURE — 65270000029 HC RM PRIVATE

## 2017-10-01 PROCEDURE — 85027 COMPLETE CBC AUTOMATED: CPT | Performed by: INTERNAL MEDICINE

## 2017-10-01 PROCEDURE — C9113 INJ PANTOPRAZOLE SODIUM, VIA: HCPCS | Performed by: INTERNAL MEDICINE

## 2017-10-01 PROCEDURE — 74011250636 HC RX REV CODE- 250/636: Performed by: INTERNAL MEDICINE

## 2017-10-01 PROCEDURE — 77030032490 HC SLV COMPR SCD KNE COVD -B

## 2017-10-01 PROCEDURE — 74011250637 HC RX REV CODE- 250/637: Performed by: INTERNAL MEDICINE

## 2017-10-01 PROCEDURE — 82962 GLUCOSE BLOOD TEST: CPT

## 2017-10-01 PROCEDURE — 36415 COLL VENOUS BLD VENIPUNCTURE: CPT | Performed by: INTERNAL MEDICINE

## 2017-10-01 PROCEDURE — 74011000250 HC RX REV CODE- 250: Performed by: INTERNAL MEDICINE

## 2017-10-01 RX ORDER — METOCLOPRAMIDE HYDROCHLORIDE 5 MG/ML
5 INJECTION INTRAMUSCULAR; INTRAVENOUS EVERY 6 HOURS
Status: DISCONTINUED | OUTPATIENT
Start: 2017-10-01 | End: 2017-10-03

## 2017-10-01 RX ORDER — POTASSIUM CHLORIDE 14.9 MG/ML
20 INJECTION INTRAVENOUS
Status: COMPLETED | OUTPATIENT
Start: 2017-10-01 | End: 2017-10-01

## 2017-10-01 RX ADMIN — SODIUM CHLORIDE 40 MG: 9 INJECTION INTRAMUSCULAR; INTRAVENOUS; SUBCUTANEOUS at 21:28

## 2017-10-01 RX ADMIN — POTASSIUM CHLORIDE 20 MEQ: 14.9 INJECTION, SOLUTION INTRAVENOUS at 11:33

## 2017-10-01 RX ADMIN — METOCLOPRAMIDE 5 MG: 5 INJECTION, SOLUTION INTRAMUSCULAR; INTRAVENOUS at 18:12

## 2017-10-01 RX ADMIN — SODIUM CHLORIDE 40 MG: 9 INJECTION INTRAMUSCULAR; INTRAVENOUS; SUBCUTANEOUS at 08:58

## 2017-10-01 RX ADMIN — POTASSIUM CHLORIDE 20 MEQ: 14.9 INJECTION, SOLUTION INTRAVENOUS at 13:55

## 2017-10-01 RX ADMIN — Medication 10 ML: at 06:28

## 2017-10-01 RX ADMIN — METOCLOPRAMIDE 5 MG: 5 INJECTION, SOLUTION INTRAMUSCULAR; INTRAVENOUS at 12:00

## 2017-10-01 RX ADMIN — Medication 10 ML: at 22:14

## 2017-10-01 RX ADMIN — ZOLPIDEM TARTRATE 5 MG: 5 TABLET ORAL at 21:28

## 2017-10-01 NOTE — PROGRESS NOTES
All hourly rounds completed throughout shift. All pt needs met at this time. Will continue to monitor pt throughout shift and give report to oncoming night shift nurse.

## 2017-10-01 NOTE — PROGRESS NOTES
GI DAILY PROGRESS NOTE    Admit Date:  9/28/2017    Today's Date:  10/1/2017    CC:  N & V, LGI bleeding    Subjective:     Patient states n&v'ing started at the time of the eclipse. She was admitted and eventually CCX performed but without relief. It is post prandial, just scant gastric juice and mucous or what she's just eaten. Medications:   Current Facility-Administered Medications   Medication Dose Route Frequency    potassium chloride 20 mEq in 100 ml IVPB  20 mEq IntraVENous Q2H    metoclopramide HCl (REGLAN) injection 5 mg  5 mg IntraVENous Q6H    [START ON 10/2/2017] sodium phosphate (FLEET'S) enema 118 mL  1 Enema Rectal NOW    dextrose 5 % - 0.45% NaCl infusion  75 mL/hr IntraVENous CONTINUOUS    pantoprazole (PROTONIX) 40 mg in sodium chloride 0.9 % 10 mL injection  40 mg IntraVENous Q12H    zolpidem (AMBIEN) tablet 5 mg  5 mg Oral QHS PRN    sodium chloride (NS) flush 5-10 mL  5-10 mL IntraVENous Q8H    sodium chloride (NS) flush 5-10 mL  5-10 mL IntraVENous PRN    acetaminophen (TYLENOL) tablet 650 mg  650 mg Oral Q4H PRN    ondansetron (ZOFRAN ODT) tablet 4 mg  4 mg Oral Q4H PRN       Review of Systems:  A review of system was obtained, with pertinent positives as listed above. All others are negative. Objective:   Vitals:  Visit Vitals    /78 (BP 1 Location: Right arm, BP Patient Position: At rest)    Pulse 98    Temp 98.2 °F (36.8 °C)    Resp 16    Ht 5' 2\" (1.575 m)    Wt 59.9 kg (132 lb)    SpO2 97%    Breastfeeding No    BMI 24.14 kg/m2     Intake/Output:        Exam:  General appearance: alert, cooperative, no distress  Lungs: clear to auscultation bilaterally anteriorly  Heart: regular rate and rhythm  Abdomen: soft, non-tender. Bowel sounds normal. No masses,  no organomegaly  Extremities: extremities normal, atraumatic, no cyanosis or edema  Neuro:  Alert and oriented without obvious neurological deficits.      Data Review (Labs):    Recent Labs 10/01/17   0532  09/30/17   1645  09/30/17   0636  09/29/17   1953  09/29/17   0453  09/28/17   1516   WBC  3.9*   --   4.7   --   6.0  8.6   HGB  11.2*   --   10.2*  10.1*  9.7*  12.6   HCT  32.8*   --   30.0*  29.6*  30.1*  36.6   PLT  466*   --   431   --   451*  627*   MCV  83.5   --   83.6   --   87.0  83.6   NA  144   --   142   --   143  139   K  3.1*   --   3.5   --   3.3*  4.0   CL  109*   --   109*   --   110*  102   CO2  25   --   24   --   17*  24   BUN  2*   --   3*   --   7*  9   CREA  0.62   --   0.56*   --   0.58*  0.81   CA  8.6   --   8.4   --   8.0*  9.1   GLU  91   --   87   --   57*  72   AP   --    --   80   --    --   91   SGOT   --    --   21   --    --   52*   ALT   --    --   14   --    --   22   TBILI   --    --   0.6   --    --   0.9   AML   --   119*   --    --    --    --    LPSE   --   697*   --    --    --   625*   PTP   --   13.0*   --    --    --    --    INR   --   1.2   --    --    --    --    APTT   --   31.3   --    --    --    --        Assessment:     Principal Problem:    Intractable nausea and vomiting (9/28/2017)        Plan:     Uncertain etiology. Has not had prior PUD and no risks. This all started with back pain but she denies narcotic or NSAID analgesics.   Because of rectal bleeding will also schedule flex with egd tomorrow, (had colo in 12/2016)

## 2017-10-01 NOTE — PROGRESS NOTES
Hospitalist Progress Note    10/1/2017  Admit Date: 2017  3:15 PM   NAME: Nasreen Momin   :  [id number]   MRN:  [id number]   Attending: Josephine Solitario MD  PCP:  Not On File [facility]    SUBJECTIVE:    79years old female presented to the hospital complaining of worsening nausea and vomiting. Patient stated symptoms started after she had her gallbladder removed at outside facility 2 weeks. Patient went to PCP office who recommended to follow up at ED for dehydration due to tahycardia. She was started on IVF, clear liquid diet with addition of PPI. GI consulted after episode of rectal bleeding . Fecal occult negative. 10-1 episode of vomiting this AM. Reports she feels slightly better. Sitting in bedside chair. Review of Systems negative with exception of pertinent positives noted above  PHYSICAL EXAM     Visit Vitals    /67 (BP 1 Location: Right arm, BP Patient Position: Sitting)    Pulse (!) 106    Temp 98.3 °F (36.8 °C)    Resp 18    Ht 5' 2\" (1.575 m)    Wt 59.9 kg (132 lb)    SpO2 96%    Breastfeeding No    BMI 24.14 kg/m2      Temp (24hrs), Av.1 °F (36.7 °C), Min:97.8 °F (36.6 °C), Max:98.3 °F (36.8 °C)    Oxygen Therapy  O2 Sat (%): 96 % (10/01/17 1132)  Pulse via Oximetry: 92 beats per minute (17 0834)  O2 Device: Room air (10/01/17 1132)  O2 Flow Rate (L/min): 2 l/min (17 0834)  FIO2 (%): 28 % (17 0612)  No intake or output data in the 24 hours ending 10/01/17 1519   General: No acute distress    Lungs:  CTA Bilaterally. Heart:  Regular rate and rhythm,  No murmur, rub, or gallop  Abdomen: Soft, Non distended, Non tender, Positive bowel sounds  Extremities: No cyanosis, clubbing or edema  Neurologic:  No focal deficits    ASSESSMENT      Active Hospital Problems    Diagnosis Date Noted    Intractable nausea and vomiting 2017     Plan:  · Nausea/vomiting - s/p recent amy. GI following. plans for flex sign/egd  Mon.  Add reglan  · Hypoglycemia - correct on d5IVf. Cpeptide/insulin levels normal  · Metabolic acidosis - resolved  · Hypokalemia - replete, repeat w/ mag in AM  · Elevated lipase - thought most likely secondary to recent amy    DVT Prophylaxis: hep sq      .     Signed By: Teresita Martins DO     October 1, 2017

## 2017-10-01 NOTE — PROGRESS NOTES
Hourly rounds performed on patient. Pt rested comfortably throughout the night with no complaints of pain. Pt remains nauseous, emesis x1. No signs of respiratory distress noted. VSS. Pt needs met at this time.

## 2017-10-02 ENCOUNTER — ANESTHESIA (OUTPATIENT)
Dept: ENDOSCOPY | Age: 67
DRG: 378 | End: 2017-10-02
Payer: MEDICARE

## 2017-10-02 ENCOUNTER — ANESTHESIA EVENT (OUTPATIENT)
Dept: ENDOSCOPY | Age: 67
DRG: 378 | End: 2017-10-02
Payer: MEDICARE

## 2017-10-02 LAB
ANION GAP SERPL CALC-SCNC: 11 MMOL/L (ref 7–16)
BUN SERPL-MCNC: <1 MG/DL (ref 8–23)
CALCIUM SERPL-MCNC: 8.3 MG/DL (ref 8.3–10.4)
CHLORIDE SERPL-SCNC: 109 MMOL/L (ref 98–107)
CO2 SERPL-SCNC: 23 MMOL/L (ref 21–32)
CREAT SERPL-MCNC: 0.55 MG/DL (ref 0.6–1)
ERYTHROCYTE [DISTWIDTH] IN BLOOD BY AUTOMATED COUNT: 13.2 % (ref 11.9–14.6)
GLUCOSE BLD STRIP.AUTO-MCNC: 100 MG/DL (ref 65–100)
GLUCOSE BLD STRIP.AUTO-MCNC: 73 MG/DL (ref 65–100)
GLUCOSE BLD STRIP.AUTO-MCNC: 82 MG/DL (ref 65–100)
GLUCOSE BLD STRIP.AUTO-MCNC: 91 MG/DL (ref 65–100)
GLUCOSE SERPL-MCNC: 87 MG/DL (ref 65–100)
HCT VFR BLD AUTO: 32 % (ref 35.8–46.3)
HGB BLD-MCNC: 10.9 G/DL (ref 11.7–15.4)
MAGNESIUM SERPL-MCNC: 1.2 MG/DL (ref 1.8–2.4)
MCH RBC QN AUTO: 28.5 PG (ref 26.1–32.9)
MCHC RBC AUTO-ENTMCNC: 34.1 G/DL (ref 31.4–35)
MCV RBC AUTO: 83.6 FL (ref 79.6–97.8)
PLATELET # BLD AUTO: 459 K/UL (ref 150–450)
PMV BLD AUTO: 9.4 FL (ref 10.8–14.1)
POTASSIUM SERPL-SCNC: 3.4 MMOL/L (ref 3.5–5.1)
RBC # BLD AUTO: 3.83 M/UL (ref 4.05–5.25)
SODIUM SERPL-SCNC: 143 MMOL/L (ref 136–145)
WBC # BLD AUTO: 3.8 K/UL (ref 4.3–11.1)

## 2017-10-02 PROCEDURE — 85027 COMPLETE CBC AUTOMATED: CPT | Performed by: INTERNAL MEDICINE

## 2017-10-02 PROCEDURE — 74011250637 HC RX REV CODE- 250/637: Performed by: INTERNAL MEDICINE

## 2017-10-02 PROCEDURE — 74011000250 HC RX REV CODE- 250: Performed by: INTERNAL MEDICINE

## 2017-10-02 PROCEDURE — 74011000250 HC RX REV CODE- 250

## 2017-10-02 PROCEDURE — 74011250636 HC RX REV CODE- 250/636: Performed by: ANESTHESIOLOGY

## 2017-10-02 PROCEDURE — 88312 SPECIAL STAINS GROUP 1: CPT | Performed by: INTERNAL MEDICINE

## 2017-10-02 PROCEDURE — C9113 INJ PANTOPRAZOLE SODIUM, VIA: HCPCS | Performed by: INTERNAL MEDICINE

## 2017-10-02 PROCEDURE — 74011250636 HC RX REV CODE- 250/636: Performed by: INTERNAL MEDICINE

## 2017-10-02 PROCEDURE — 74011250636 HC RX REV CODE- 250/636: Performed by: FAMILY MEDICINE

## 2017-10-02 PROCEDURE — 74011250636 HC RX REV CODE- 250/636

## 2017-10-02 PROCEDURE — 0DJD8ZZ INSPECTION OF LOWER INTESTINAL TRACT, VIA NATURAL OR ARTIFICIAL OPENING ENDOSCOPIC: ICD-10-PCS | Performed by: INTERNAL MEDICINE

## 2017-10-02 PROCEDURE — 0DJ08ZZ INSPECTION OF UPPER INTESTINAL TRACT, VIA NATURAL OR ARTIFICIAL OPENING ENDOSCOPIC: ICD-10-PCS | Performed by: INTERNAL MEDICINE

## 2017-10-02 PROCEDURE — 83735 ASSAY OF MAGNESIUM: CPT | Performed by: INTERNAL MEDICINE

## 2017-10-02 PROCEDURE — 82962 GLUCOSE BLOOD TEST: CPT

## 2017-10-02 PROCEDURE — 77030009426 HC FCPS BIOP ENDOSC BSC -B: Performed by: INTERNAL MEDICINE

## 2017-10-02 PROCEDURE — 88305 TISSUE EXAM BY PATHOLOGIST: CPT | Performed by: INTERNAL MEDICINE

## 2017-10-02 PROCEDURE — 74011000258 HC RX REV CODE- 258: Performed by: INTERNAL MEDICINE

## 2017-10-02 PROCEDURE — 65270000029 HC RM PRIVATE

## 2017-10-02 PROCEDURE — 80048 BASIC METABOLIC PNL TOTAL CA: CPT | Performed by: INTERNAL MEDICINE

## 2017-10-02 PROCEDURE — 36415 COLL VENOUS BLD VENIPUNCTURE: CPT | Performed by: INTERNAL MEDICINE

## 2017-10-02 PROCEDURE — 76060000031 HC ANESTHESIA FIRST 0.5 HR: Performed by: INTERNAL MEDICINE

## 2017-10-02 PROCEDURE — 76040000025: Performed by: INTERNAL MEDICINE

## 2017-10-02 RX ORDER — DIPHENHYDRAMINE HYDROCHLORIDE 50 MG/ML
12.5 INJECTION, SOLUTION INTRAMUSCULAR; INTRAVENOUS
Status: CANCELLED | OUTPATIENT
Start: 2017-10-02

## 2017-10-02 RX ORDER — SODIUM CHLORIDE, SODIUM LACTATE, POTASSIUM CHLORIDE, CALCIUM CHLORIDE 600; 310; 30; 20 MG/100ML; MG/100ML; MG/100ML; MG/100ML
100 INJECTION, SOLUTION INTRAVENOUS CONTINUOUS
Status: DISCONTINUED | OUTPATIENT
Start: 2017-10-02 | End: 2017-10-03

## 2017-10-02 RX ORDER — PROPOFOL 10 MG/ML
INJECTION, EMULSION INTRAVENOUS
Status: DISCONTINUED | OUTPATIENT
Start: 2017-10-02 | End: 2017-10-02 | Stop reason: HOSPADM

## 2017-10-02 RX ORDER — NALOXONE HYDROCHLORIDE 0.4 MG/ML
0.1 INJECTION, SOLUTION INTRAMUSCULAR; INTRAVENOUS; SUBCUTANEOUS AS NEEDED
Status: CANCELLED | OUTPATIENT
Start: 2017-10-02

## 2017-10-02 RX ORDER — HYDROMORPHONE HYDROCHLORIDE 2 MG/ML
0.5 INJECTION, SOLUTION INTRAMUSCULAR; INTRAVENOUS; SUBCUTANEOUS
Status: CANCELLED | OUTPATIENT
Start: 2017-10-02

## 2017-10-02 RX ORDER — MIDAZOLAM HYDROCHLORIDE 1 MG/ML
2 INJECTION, SOLUTION INTRAMUSCULAR; INTRAVENOUS ONCE
Status: ACTIVE | OUTPATIENT
Start: 2017-10-02 | End: 2017-10-02

## 2017-10-02 RX ORDER — LIDOCAINE HYDROCHLORIDE 10 MG/ML
0.1 INJECTION INFILTRATION; PERINEURAL AS NEEDED
Status: DISCONTINUED | OUTPATIENT
Start: 2017-10-02 | End: 2017-10-03 | Stop reason: HOSPADM

## 2017-10-02 RX ORDER — OXYCODONE HYDROCHLORIDE 5 MG/1
5 TABLET ORAL
Status: CANCELLED | OUTPATIENT
Start: 2017-10-02

## 2017-10-02 RX ORDER — FENTANYL CITRATE 50 UG/ML
100 INJECTION, SOLUTION INTRAMUSCULAR; INTRAVENOUS ONCE
Status: ACTIVE | OUTPATIENT
Start: 2017-10-02 | End: 2017-10-02

## 2017-10-02 RX ORDER — ONDANSETRON 2 MG/ML
4 INJECTION INTRAMUSCULAR; INTRAVENOUS ONCE
Status: CANCELLED | OUTPATIENT
Start: 2017-10-02 | End: 2017-10-02

## 2017-10-02 RX ORDER — ALBUTEROL SULFATE 0.83 MG/ML
2.5 SOLUTION RESPIRATORY (INHALATION) AS NEEDED
Status: CANCELLED | OUTPATIENT
Start: 2017-10-02

## 2017-10-02 RX ORDER — LIDOCAINE HYDROCHLORIDE 20 MG/ML
INJECTION, SOLUTION EPIDURAL; INFILTRATION; INTRACAUDAL; PERINEURAL AS NEEDED
Status: DISCONTINUED | OUTPATIENT
Start: 2017-10-02 | End: 2017-10-02 | Stop reason: HOSPADM

## 2017-10-02 RX ORDER — PROPOFOL 10 MG/ML
INJECTION, EMULSION INTRAVENOUS AS NEEDED
Status: DISCONTINUED | OUTPATIENT
Start: 2017-10-02 | End: 2017-10-02 | Stop reason: HOSPADM

## 2017-10-02 RX ORDER — MIDAZOLAM HYDROCHLORIDE 1 MG/ML
2 INJECTION, SOLUTION INTRAMUSCULAR; INTRAVENOUS
Status: DISCONTINUED | OUTPATIENT
Start: 2017-10-02 | End: 2017-10-03

## 2017-10-02 RX ORDER — MAGNESIUM SULFATE HEPTAHYDRATE 40 MG/ML
2 INJECTION, SOLUTION INTRAVENOUS ONCE
Status: COMPLETED | OUTPATIENT
Start: 2017-10-02 | End: 2017-10-02

## 2017-10-02 RX ORDER — OXYCODONE HYDROCHLORIDE 5 MG/1
10 TABLET ORAL
Status: CANCELLED | OUTPATIENT
Start: 2017-10-02

## 2017-10-02 RX ORDER — SODIUM CHLORIDE, SODIUM LACTATE, POTASSIUM CHLORIDE, CALCIUM CHLORIDE 600; 310; 30; 20 MG/100ML; MG/100ML; MG/100ML; MG/100ML
100 INJECTION, SOLUTION INTRAVENOUS CONTINUOUS
Status: CANCELLED | OUTPATIENT
Start: 2017-10-02

## 2017-10-02 RX ADMIN — ONDANSETRON 4 MG: 4 TABLET, ORALLY DISINTEGRATING ORAL at 17:47

## 2017-10-02 RX ADMIN — Medication 10 ML: at 05:32

## 2017-10-02 RX ADMIN — PROPOFOL 140 MCG/KG/MIN: 10 INJECTION, EMULSION INTRAVENOUS at 11:54

## 2017-10-02 RX ADMIN — SODIUM CHLORIDE 40 MG: 9 INJECTION INTRAMUSCULAR; INTRAVENOUS; SUBCUTANEOUS at 21:29

## 2017-10-02 RX ADMIN — SODIUM CHLORIDE 40 MG: 9 INJECTION INTRAMUSCULAR; INTRAVENOUS; SUBCUTANEOUS at 09:09

## 2017-10-02 RX ADMIN — METOCLOPRAMIDE 5 MG: 5 INJECTION, SOLUTION INTRAMUSCULAR; INTRAVENOUS at 16:59

## 2017-10-02 RX ADMIN — SODIUM CHLORIDE, SODIUM LACTATE, POTASSIUM CHLORIDE, AND CALCIUM CHLORIDE 100 ML/HR: 600; 310; 30; 20 INJECTION, SOLUTION INTRAVENOUS at 10:17

## 2017-10-02 RX ADMIN — DEXTROSE MONOHYDRATE AND SODIUM CHLORIDE 75 ML/HR: 5; .45 INJECTION, SOLUTION INTRAVENOUS at 00:27

## 2017-10-02 RX ADMIN — SODIUM PHOSPHATE 118 ML: 7; 19 ENEMA RECTAL at 09:09

## 2017-10-02 RX ADMIN — Medication 10 ML: at 21:29

## 2017-10-02 RX ADMIN — METOCLOPRAMIDE 5 MG: 5 INJECTION, SOLUTION INTRAMUSCULAR; INTRAVENOUS at 23:26

## 2017-10-02 RX ADMIN — MAGNESIUM SULFATE HEPTAHYDRATE 2 G: 40 INJECTION, SOLUTION INTRAVENOUS at 05:24

## 2017-10-02 RX ADMIN — METOCLOPRAMIDE 5 MG: 5 INJECTION, SOLUTION INTRAMUSCULAR; INTRAVENOUS at 05:24

## 2017-10-02 RX ADMIN — PROPOFOL 30 MG: 10 INJECTION, EMULSION INTRAVENOUS at 11:54

## 2017-10-02 RX ADMIN — LIDOCAINE HYDROCHLORIDE 60 MG: 20 INJECTION, SOLUTION EPIDURAL; INFILTRATION; INTRACAUDAL; PERINEURAL at 11:54

## 2017-10-02 RX ADMIN — METOCLOPRAMIDE 5 MG: 5 INJECTION, SOLUTION INTRAMUSCULAR; INTRAVENOUS at 00:11

## 2017-10-02 NOTE — ANESTHESIA POSTPROCEDURE EVALUATION
Post-Anesthesia Evaluation and Assessment    Patient: Karthik Morin MRN: [id number]  SSN: [id number]    YOB: 1950  Age: 79 y.o. Sex: female       Cardiovascular Function/Vital Signs  Visit Vitals    /73    Pulse 91    Temp 36.7 °C (98 °F)    Resp 12    Ht 5' 2\" (1.575 m)    Wt 59.9 kg (132 lb)    SpO2 96%    Breastfeeding No    BMI 24.14 kg/m2       Patient is status post total IV anesthesia anesthesia for Procedure(s):  ESOPHAGOGASTRODUODENOSCOPY (EGD)  SIGMOIDOSCOPY FLEXIBLE  ESOPHAGOGASTRODUODENAL (EGD) BIOPSY. Nausea/Vomiting: None    Postoperative hydration reviewed and adequate. Pain:  Pain Scale 1: Numeric (0 - 10) (10/02/17 0957)  Pain Intensity 1: 0 (10/02/17 0957)   Managed    Neurological Status: At baseline    Mental Status and Level of Consciousness: Arousable    Pulmonary Status:   O2 Device: Room air (10/02/17 0957)   Adequate oxygenation and airway patent    Complications related to anesthesia: None    Post-anesthesia assessment completed.  No concerns    Signed By: Emmanuel Mccain MD     October 2, 2017

## 2017-10-02 NOTE — PROGRESS NOTES
Hourly rounds completed. Sitting on side of bed talking with visitors. Resp unlabored No active bleeding noted this shift. Reports passing flatus. Denies further nausea at this time. Voices no c/o pain. All needs met at this time.

## 2017-10-02 NOTE — PROGRESS NOTES
TRANSFER - IN REPORT:    Verbal report received from [address] on [address]  being received from [id number] for ordered procedure      Report consisted of patients Situation, Background, Assessment and   Recommendations(SBAR). Information from the following report(s) SBAR, MAR and Recent Results was reviewed with the receiving nurse. Opportunity for questions and clarification was provided. Assessment completed upon patients arrival to unit and care assumed.

## 2017-10-02 NOTE — PROCEDURES
Endoscopic Gastroduodenoscopy Procedure Note    Indications: n/v    Anesthesia/Sedation: MAC IV     Pre-Procedure Physical:    Current Facility-Administered Medications   Medication Dose Route Frequency    lidocaine (XYLOCAINE) 10 mg/mL (1 %) injection 0.1 mL  0.1 mL SubCUTAneous PRN    lactated Ringers infusion  100 mL/hr IntraVENous CONTINUOUS    fentaNYL citrate (PF) injection 100 mcg  100 mcg IntraVENous ONCE    midazolam (VERSED) injection 2 mg  2 mg IntraVENous ONCE PRN    midazolam (VERSED) injection 2 mg  2 mg IntraVENous ONCE    metoclopramide HCl (REGLAN) injection 5 mg  5 mg IntraVENous Q6H    dextrose 5 % - 0.45% NaCl infusion  75 mL/hr IntraVENous CONTINUOUS    pantoprazole (PROTONIX) 40 mg in sodium chloride 0.9 % 10 mL injection  40 mg IntraVENous Q12H    zolpidem (AMBIEN) tablet 5 mg  5 mg Oral QHS PRN    sodium chloride (NS) flush 5-10 mL  5-10 mL IntraVENous Q8H    sodium chloride (NS) flush 5-10 mL  5-10 mL IntraVENous PRN    acetaminophen (TYLENOL) tablet 650 mg  650 mg Oral Q4H PRN    ondansetron (ZOFRAN ODT) tablet 4 mg  4 mg Oral Q4H PRN      Review of patient's allergies indicates no known allergies. Patient Vitals for the past 8 hrs:   BP Temp Pulse Resp SpO2   10/02/17 0957 113/73 - 91 12 96 %   10/02/17 0746 115/72 98 °F (36.7 °C) 95 16 99 %   10/02/17 0450 104/69 97.9 °F (36.6 °C) 98 17 98 %       Exam      Airway: clear   Heart: normal S1and S2    Lungs: clear bilateral  Abdomen: soft, nontender, bowel sounds present and normal in all quads   Mental Status: awake, alert and oriented to person, place and time          Procedure Details     Informed consent was obtained for the procedure, including conscious sedation. Risks of pancreatitis, infection, perforation, hemorrhage, adverse drug reaction and aspiration were discussed. The patient was placed in the left lateral decubitus position.   Based on the pre-procedure assessment, including review of the patient's medical history, medications, allergies, and review of systems, she had been deemed to be an appropriate candidate for conscious sedation; she was therefore sedated with the medications listed below. She was monitored continuously with ECG tracing, pulse oximetry, blood pressure monitoring, and direct observation. The EGD gastroscope was inserted into the mouth and advanced under direct vision to the second portion of the duodenum. A careful inspection was made as the gastroscope was withdrawn, including a retroflexed view of the proximal stomach; findings and interventions are described below. Appropriate photodocumentation was obtained. Findings:   Esophagus- Normal.  Stomach- Normal.  Biopsied. Duodenum- Medium sized clean-based duodenal bulb ulcer. Therapies: Biopsy    Specimens: Gastric    Estimated Blood Loss: 0 cc           Complications:   None; patient tolerated the procedure well. Attending Attestation:  I performed the procedure. Impression:    Duodenal bulb ulcer. Recommendations:  Await path results. Avoid NSAIDS. Bid PPI.

## 2017-10-02 NOTE — PROGRESS NOTES
TRANSFER - IN REPORT:    Verbal report received from [facility] on Jeni Prader  being received from GI Lab for routine progression of care      Report consisted of patients Situation, Background, Assessment and   Recommendations(SBAR). Information from the following report(s) SBAR, Kardex, Procedure Summary, Intake/Output, MAR, Accordion, Recent Results and Med Rec Status was reviewed with the receiving nurse. Opportunity for questions and clarification was provided. Assessment completed upon patients arrival to unit and care assumed.

## 2017-10-02 NOTE — PROGRESS NOTES
Hospitalist Progress Note    10/2/2017  Admit Date: 2017  3:15 PM   NAME: Danton Kehr   :     MRN:  [id number]   Attending: Marlene Shea MD  PCP:  Not On File [facility]    SUBJECTIVE:    79years old female presented to the hospital complaining of worsening nausea and vomiting. Patient stated symptoms started after she had her gallbladder removed at [facility] 2 weeks. Patient went to PCP office who recommended to follow up at ED for dehydration due to tahycardia. She was started on IVF, clear liquid diet with addition of PPI. GI consulted after episode of rectal bleeding . Fecal occult negative. Vomiting persisted- EGD/flex sig on 10/2 with finding of medium sized non bleeding duodenal ulcer    10-2 - seen post procedure. Still very sleepy. Refused trial of liquid diet after procedure. Review of Systems negative with exception of pertinent positives noted above  PHYSICAL EXAM     Visit Vitals    /72 (BP 1 Location: Right arm, BP Patient Position: At rest)    Pulse 88    Temp 97.9 °F (36.6 °C)    Resp 16    Ht 5' 2\" (1.575 m)    Wt 59.9 kg (132 lb)    SpO2 98%    Breastfeeding No    BMI 24.14 kg/m2      Temp (24hrs), Av.1 °F (36.7 °C), Min:97.9 °F (36.6 °C), Max:98.6 °F (37 °C)    Oxygen Therapy  O2 Sat (%): 98 % (10/02/17 1356)  Pulse via Oximetry: 88 beats per minute (10/02/17 1221)  O2 Device: Room air (10/02/17 1241)  O2 Flow Rate (L/min): 2 l/min (17 0834)  FIO2 (%): 28 % (17 0612)    Intake/Output Summary (Last 24 hours) at 10/02/17 1424  Last data filed at 10/02/17 1327   Gross per 24 hour   Intake             6507 ml   Output                0 ml   Net             6507 ml      General: No acute distress    Lungs:  CTA Bilaterally.    Heart:  Regular rate and rhythm,  No murmur, rub, or gallop  Abdomen: Soft, Non distended, Non tender, Positive bowel sounds  Extremities: No cyanosis, clubbing or edema  Neurologic:  No focal deficits    ASSESSMENT      Active Hospital Problems    Diagnosis Date Noted    Intractable nausea and vomiting 09/28/2017     Plan:  · Nausea/vomiting - s/p recent amy at [address] US abd unremarkable. GI following. EGD/flex sig 9/2 with finding of duodenal ulcer. Cont ppi bid/ reglan. · Hypoglycemia - monitor off d5. cpeptide/ insulin levels ok  · Metabolic acidosis - resolved  · Hypokalemia - replete, repeat w/ mag in AM  · hypomag - repleted this AM  · Elevated lipase - thought most likely secondary to recent amy    DVT Prophylaxis: hep sq    Disposition - encouraged trial of liquid diet today to ensure vomiting resolved. Hopeful for discharge tonight vs tomorrow.      Signed By: Aj Beck DO     October 2, 2017

## 2017-10-02 NOTE — ANESTHESIA PREPROCEDURE EVALUATION
Anesthetic History               Review of Systems / Medical History  Patient summary reviewed, nursing notes reviewed and pertinent labs reviewed    Pulmonary    COPD: moderate    Sleep apnea: No treatment    Asthma        Neuro/Psych       CVA: no residual symptoms       Cardiovascular    Hypertension: well controlled              Exercise tolerance: >4 METS     GI/Hepatic/Renal                Endo/Other             Other Findings            Physical Exam    Airway  Mallampati: III  TM Distance: < 4 cm  Neck ROM: short neck   Mouth opening: Diminished (comment)     Cardiovascular  Regular rate and rhythm,  S1 and S2 normal,  no murmur, click, rub, or gallop             Dental  No notable dental hx       Pulmonary  Breath sounds clear to auscultation               Abdominal         Other Findings            Anesthetic Plan    ASA: 3  Anesthesia type: total IV anesthesia          Induction: Intravenous  Anesthetic plan and risks discussed with: Patient

## 2017-10-02 NOTE — PROGRESS NOTES
Returned to room from GI lab Drowsy but oriented x 4. Resp even and unlabored. O2 at 2 l per NC applied due to pt drifting to sleep when not being spoken to. No bleeding or abd tenderness noted. Active bowel sounds. Voices no c/o pain. Family at bedside. Iv patent.

## 2017-10-02 NOTE — PROCEDURES
Flex Sig Procedure Note    Indications: Hematochezia    Anesthesia/Sedation: MAC IV     Pre-Procedure Physical:  Current Facility-Administered Medications   Medication Dose Route Frequency    lidocaine (XYLOCAINE) 10 mg/mL (1 %) injection 0.1 mL  0.1 mL SubCUTAneous PRN    lactated Ringers infusion  100 mL/hr IntraVENous CONTINUOUS    fentaNYL citrate (PF) injection 100 mcg  100 mcg IntraVENous ONCE    midazolam (VERSED) injection 2 mg  2 mg IntraVENous ONCE PRN    midazolam (VERSED) injection 2 mg  2 mg IntraVENous ONCE    metoclopramide HCl (REGLAN) injection 5 mg  5 mg IntraVENous Q6H    dextrose 5 % - 0.45% NaCl infusion  75 mL/hr IntraVENous CONTINUOUS    pantoprazole (PROTONIX) 40 mg in sodium chloride 0.9 % 10 mL injection  40 mg IntraVENous Q12H    zolpidem (AMBIEN) tablet 5 mg  5 mg Oral QHS PRN    sodium chloride (NS) flush 5-10 mL  5-10 mL IntraVENous Q8H    sodium chloride (NS) flush 5-10 mL  5-10 mL IntraVENous PRN    acetaminophen (TYLENOL) tablet 650 mg  650 mg Oral Q4H PRN    ondansetron (ZOFRAN ODT) tablet 4 mg  4 mg Oral Q4H PRN     Facility-Administered Medications Ordered in Other Encounters   Medication Dose Route Frequency    lidocaine (PF) (XYLOCAINE) 20 mg/mL (2 %) injection   IntraVENous PRN    propofol (DIPRIVAN) 10 mg/mL injection    PRN    propofol (DIPRIVAN) 10 mg/mL injection    CONTINUOUS      Review of patient's allergies indicates no known allergies. Patient Vitals for the past 8 hrs:   BP Temp Pulse Resp SpO2   10/02/17 0957 113/73 - 91 12 96 %   10/02/17 0746 115/72 98 °F (36.7 °C) 95 16 99 %   10/02/17 0450 104/69 97.9 °F (36.6 °C) 98 17 98 %       Exam:    Airway: clear   Heart: normal S1and S2    Lungs: clear bilateral  Abdomen: soft, nontender, bowel sounds present and normal in all quads   Mental Status: awake, alert and oriented to person, place and time        Procedure Details      Informed consent was obtained for the procedure, including sedation. Risks of perforation, hemorrhage, adverse drug reaction and aspiration were discussed. The patient was placed in the left lateral decubitus position. Based on the pre-procedure assessment, including review of the patient's medical history, medications, allergies, and review of systems, she had been deemed to be an appropriate candidate for conscious sedation; she was therefore sedated with the medications listed below. The patient was monitored continuously with ECG tracing, pulse oximetry, blood pressure monitoring, and direct observations. A rectal examination was performed. The EGD scope was inserted into the rectum and advanced under direct vision to the descending colon. The quality of the colonic preparation was good. A careful inspection was made as the colonoscope was withdrawn, including a retroflexed view of the rectum; findings and interventions are described below. Appropriate photodocumentation was obtained. Findings: Moderate sigmoid diverticulosis. Specimens: None    Estimated Blood Loss: 0 cc           Complications: None; patient tolerated the procedure well. Attending Attestation: I performed the procedure. Impression:    Moderate sigmoid diverticulosis.     Recommendations:   Per GI team.

## 2017-10-02 NOTE — PROGRESS NOTES
Hourly rounds completed this shift. Patient with critical lab value Magnesium 1.7. Orders received from MD.      No signs of distress through shift. Will continue to monitor and report to day shift RN.

## 2017-10-03 VITALS
BODY MASS INDEX: 24.29 KG/M2 | OXYGEN SATURATION: 98 % | RESPIRATION RATE: 19 BRPM | DIASTOLIC BLOOD PRESSURE: 69 MMHG | SYSTOLIC BLOOD PRESSURE: 135 MMHG | HEIGHT: 62 IN | HEART RATE: 72 BPM | WEIGHT: 132 LBS | TEMPERATURE: 98.3 F

## 2017-10-03 PROBLEM — K26.9 DUODENAL ULCER: Status: ACTIVE | Noted: 2017-10-03

## 2017-10-03 PROBLEM — I10 HYPERTENSION: Chronic | Status: ACTIVE | Noted: 2017-10-03

## 2017-10-03 PROBLEM — R91.8 PULMONARY NODULES: Chronic | Status: ACTIVE | Noted: 2017-10-03

## 2017-10-03 LAB
ANION GAP SERPL CALC-SCNC: 9 MMOL/L (ref 7–16)
BUN SERPL-MCNC: 2 MG/DL (ref 8–23)
CALCIUM SERPL-MCNC: 8.5 MG/DL (ref 8.3–10.4)
CHLORIDE SERPL-SCNC: 109 MMOL/L (ref 98–107)
CO2 SERPL-SCNC: 25 MMOL/L (ref 21–32)
CREAT SERPL-MCNC: 0.58 MG/DL (ref 0.6–1)
ERYTHROCYTE [DISTWIDTH] IN BLOOD BY AUTOMATED COUNT: 13.3 % (ref 11.9–14.6)
GLUCOSE BLD STRIP.AUTO-MCNC: 75 MG/DL (ref 65–100)
GLUCOSE BLD STRIP.AUTO-MCNC: 83 MG/DL (ref 65–100)
GLUCOSE SERPL-MCNC: 81 MG/DL (ref 65–100)
HCT VFR BLD AUTO: 32.2 % (ref 35.8–46.3)
HGB BLD-MCNC: 10.4 G/DL (ref 11.7–15.4)
MAGNESIUM SERPL-MCNC: 1.8 MG/DL (ref 1.8–2.4)
MCH RBC QN AUTO: 27.7 PG (ref 26.1–32.9)
MCHC RBC AUTO-ENTMCNC: 32.3 G/DL (ref 31.4–35)
MCV RBC AUTO: 85.6 FL (ref 79.6–97.8)
PLATELET # BLD AUTO: 408 K/UL (ref 150–450)
PMV BLD AUTO: 9.1 FL (ref 10.8–14.1)
POTASSIUM SERPL-SCNC: 3.1 MMOL/L (ref 3.5–5.1)
RBC # BLD AUTO: 3.76 M/UL (ref 4.05–5.25)
SODIUM SERPL-SCNC: 143 MMOL/L (ref 136–145)
WBC # BLD AUTO: 4.9 K/UL (ref 4.3–11.1)
WBC #/AREA STL HPF: NORMAL /HPF (ref 0–4)

## 2017-10-03 PROCEDURE — 74011000250 HC RX REV CODE- 250: Performed by: INTERNAL MEDICINE

## 2017-10-03 PROCEDURE — 74011250637 HC RX REV CODE- 250/637: Performed by: HOSPITALIST

## 2017-10-03 PROCEDURE — 82962 GLUCOSE BLOOD TEST: CPT

## 2017-10-03 PROCEDURE — 74011250636 HC RX REV CODE- 250/636: Performed by: HOSPITALIST

## 2017-10-03 PROCEDURE — 36415 COLL VENOUS BLD VENIPUNCTURE: CPT | Performed by: FAMILY MEDICINE

## 2017-10-03 PROCEDURE — 87338 HPYLORI STOOL AG IA: CPT | Performed by: HOSPITALIST

## 2017-10-03 PROCEDURE — 85027 COMPLETE CBC AUTOMATED: CPT | Performed by: FAMILY MEDICINE

## 2017-10-03 PROCEDURE — 74011250637 HC RX REV CODE- 250/637: Performed by: INTERNAL MEDICINE

## 2017-10-03 PROCEDURE — 86677 HELICOBACTER PYLORI ANTIBODY: CPT | Performed by: HOSPITALIST

## 2017-10-03 PROCEDURE — C9113 INJ PANTOPRAZOLE SODIUM, VIA: HCPCS | Performed by: INTERNAL MEDICINE

## 2017-10-03 PROCEDURE — 87045 FECES CULTURE AEROBIC BACT: CPT | Performed by: HOSPITALIST

## 2017-10-03 PROCEDURE — 74011250636 HC RX REV CODE- 250/636: Performed by: INTERNAL MEDICINE

## 2017-10-03 PROCEDURE — 80048 BASIC METABOLIC PNL TOTAL CA: CPT | Performed by: FAMILY MEDICINE

## 2017-10-03 PROCEDURE — 89055 LEUKOCYTE ASSESSMENT FECAL: CPT | Performed by: HOSPITALIST

## 2017-10-03 PROCEDURE — 83735 ASSAY OF MAGNESIUM: CPT | Performed by: FAMILY MEDICINE

## 2017-10-03 RX ORDER — MONTELUKAST SODIUM 10 MG/1
10 TABLET ORAL DAILY
Status: DISCONTINUED | OUTPATIENT
Start: 2017-10-03 | End: 2017-10-03 | Stop reason: HOSPADM

## 2017-10-03 RX ORDER — ASPIRIN 325 MG
325 TABLET ORAL DAILY
Qty: 1 TAB | Refills: 0 | Status: SHIPPED
Start: 2017-10-10

## 2017-10-03 RX ORDER — ALBUTEROL SULFATE 0.83 MG/ML
2.5 SOLUTION RESPIRATORY (INHALATION)
Status: DISCONTINUED | OUTPATIENT
Start: 2017-10-03 | End: 2017-10-03 | Stop reason: HOSPADM

## 2017-10-03 RX ORDER — METOPROLOL TARTRATE 25 MG/1
25 TABLET, FILM COATED ORAL 2 TIMES DAILY
Status: DISCONTINUED | OUTPATIENT
Start: 2017-10-03 | End: 2017-10-03

## 2017-10-03 RX ORDER — ACETAMINOPHEN 325 MG/1
650 TABLET ORAL
Qty: 12 TAB | Refills: 0 | Status: SHIPPED | OUTPATIENT
Start: 2017-10-03

## 2017-10-03 RX ORDER — ATORVASTATIN CALCIUM 40 MG/1
40 TABLET, FILM COATED ORAL
Status: DISCONTINUED | OUTPATIENT
Start: 2017-10-03 | End: 2017-10-03 | Stop reason: HOSPADM

## 2017-10-03 RX ORDER — METOPROLOL TARTRATE 25 MG/1
12.5 TABLET, FILM COATED ORAL 2 TIMES DAILY
Status: DISCONTINUED | OUTPATIENT
Start: 2017-10-03 | End: 2017-10-03

## 2017-10-03 RX ORDER — PRAVASTATIN SODIUM 20 MG/1
40 TABLET ORAL
Status: DISCONTINUED | OUTPATIENT
Start: 2017-10-03 | End: 2017-10-03

## 2017-10-03 RX ORDER — LISINOPRIL 5 MG/1
2.5 TABLET ORAL DAILY
Status: DISCONTINUED | OUTPATIENT
Start: 2017-10-03 | End: 2017-10-03

## 2017-10-03 RX ORDER — MAGNESIUM SULFATE 1 G/100ML
1 INJECTION INTRAVENOUS ONCE
Status: COMPLETED | OUTPATIENT
Start: 2017-10-03 | End: 2017-10-03

## 2017-10-03 RX ORDER — LISINOPRIL 5 MG/1
2.5 TABLET ORAL DAILY
Qty: 30 TAB | Refills: 0 | Status: SHIPPED | OUTPATIENT
Start: 2017-10-03

## 2017-10-03 RX ORDER — PANTOPRAZOLE SODIUM 40 MG/1
40 TABLET, DELAYED RELEASE ORAL 2 TIMES DAILY
Qty: 60 TAB | Refills: 0 | Status: SHIPPED | OUTPATIENT
Start: 2017-10-03

## 2017-10-03 RX ORDER — METOPROLOL TARTRATE 25 MG/1
12.5 TABLET, FILM COATED ORAL 2 TIMES DAILY
Qty: 1 TAB | Refills: 0 | Status: SHIPPED | OUTPATIENT
Start: 2017-10-03

## 2017-10-03 RX ORDER — METOPROLOL TARTRATE 25 MG/1
12.5 TABLET, FILM COATED ORAL 2 TIMES DAILY
Status: DISCONTINUED | OUTPATIENT
Start: 2017-10-03 | End: 2017-10-03 | Stop reason: HOSPADM

## 2017-10-03 RX ORDER — PROMETHAZINE HYDROCHLORIDE 25 MG/1
25 TABLET ORAL
Qty: 12 TAB | Refills: 0 | Status: SHIPPED | OUTPATIENT
Start: 2017-10-03

## 2017-10-03 RX ORDER — METOCLOPRAMIDE HYDROCHLORIDE 5 MG/ML
5 INJECTION INTRAMUSCULAR; INTRAVENOUS
Status: DISCONTINUED | OUTPATIENT
Start: 2017-10-03 | End: 2017-10-03 | Stop reason: HOSPADM

## 2017-10-03 RX ORDER — ATORVASTATIN CALCIUM 40 MG/1
40 TABLET, FILM COATED ORAL
Qty: 1 TAB | Refills: 0 | Status: SHIPPED
Start: 2017-10-03

## 2017-10-03 RX ORDER — POTASSIUM CHLORIDE 14.9 MG/ML
20 INJECTION INTRAVENOUS
Status: COMPLETED | OUTPATIENT
Start: 2017-10-03 | End: 2017-10-03

## 2017-10-03 RX ADMIN — POTASSIUM CHLORIDE 20 MEQ: 14.9 INJECTION, SOLUTION INTRAVENOUS at 14:34

## 2017-10-03 RX ADMIN — Medication 10 ML: at 06:13

## 2017-10-03 RX ADMIN — Medication 10 ML: at 14:46

## 2017-10-03 RX ADMIN — MONTELUKAST SODIUM 10 MG: 10 TABLET, FILM COATED ORAL at 11:02

## 2017-10-03 RX ADMIN — METOPROLOL TARTRATE 12.5 MG: 25 TABLET ORAL at 11:26

## 2017-10-03 RX ADMIN — MAGNESIUM SULFATE HEPTAHYDRATE 1 G: 1 INJECTION, SOLUTION INTRAVENOUS at 09:43

## 2017-10-03 RX ADMIN — POTASSIUM CHLORIDE 20 MEQ: 14.9 INJECTION, SOLUTION INTRAVENOUS at 11:26

## 2017-10-03 RX ADMIN — METOCLOPRAMIDE 5 MG: 5 INJECTION, SOLUTION INTRAMUSCULAR; INTRAVENOUS at 06:13

## 2017-10-03 RX ADMIN — ACETAMINOPHEN 650 MG: 325 TABLET, FILM COATED ORAL at 00:31

## 2017-10-03 RX ADMIN — SODIUM CHLORIDE 40 MG: 9 INJECTION INTRAMUSCULAR; INTRAVENOUS; SUBCUTANEOUS at 08:30

## 2017-10-03 NOTE — PROGRESS NOTES
Prior to discharge pt needs to receive IV K+ and stool sample needs to result. Per MD. Will follow and admin d/c instructions when appropriate.

## 2017-10-03 NOTE — DISCHARGE INSTRUCTIONS
Hold Aspirin for 7 days  Take Protonix 40 mg PO BID until seen by gastroenterology  AVOID NSAIDs (Advisl, Naproxen, etc), spicy food, alcohol  Follow-up with PCP in 3-5 days with repeat CBC, BMP and Mg level at that time  Follow-up with gastroenterology in 2 weeks  H. Pilory IgM  And pathology pending at the time of discharge, please discuss with gastroenterology and PCP results and further management. If any worrisome signs or symptoms, alike increased abdominal pain, fevers, diarrhea, blood in stool or vomitus please call PCP, Gastroenterology, 911 or go to nearest ED. DISCHARGE SUMMARY from Nurse    The following personal items are in your possession at time of discharge:    Dental Appliances: None  Visual Aid: Contacts, At home  Hearing Aids/Status: At home, Bilateral  Home Medications:  (Inhaler;  will bring meds in am )  Jewelry: None  Clothing: At bedside  Other Valuables: Cell Phone, At bedside  Personal Items Sent to Safe:  (None)          PATIENT INSTRUCTIONS:    After general anesthesia or intravenous sedation, for 24 hours or while taking prescription Narcotics:  · Limit your activities  · Do not drive and operate hazardous machinery  · Do not make important personal or business decisions  · Do  not drink alcoholic beverages  · If you have not urinated within 8 hours after discharge, please contact your surgeon on call.     Report the following to your surgeon:  · Excessive pain, swelling, redness or odor of or around the surgical area  · Temperature over 100.5  · Nausea and vomiting lasting longer than 4 hours or if unable to take medications  · Any signs of decreased circulation or nerve impairment to extremity: change in color, persistent  numbness, tingling, coldness or increase pain  · Any questions        What to do at Home:  Recommended activity: Activity as tolerated, see MD instructions above      *  Please give a list of your current medications to your Primary Care Provider. *  Please update this list whenever your medications are discontinued, doses are      changed, or new medications (including over-the-counter products) are added. *  Please carry medication information at all times in case of emergency situations. These are general instructions for a healthy lifestyle:    No smoking/ No tobacco products/ Avoid exposure to second hand smoke    Surgeon General's Warning:  Quitting smoking now greatly reduces serious risk to your health. Obesity, smoking, and sedentary lifestyle greatly increases your risk for illness    A healthy diet, regular physical exercise & weight monitoring are important for maintaining a healthy lifestyle    You may be retaining fluid if you have a history of heart failure or if you experience any of the following symptoms:  Weight gain of 3 pounds or more overnight or 5 pounds in a week, increased swelling in our hands or feet or shortness of breath while lying flat in bed. Please call your doctor as soon as you notice any of these symptoms; do not wait until your next office visit. Recognize signs and symptoms of STROKE:    F-face looks uneven    A-arms unable to move or move unevenly    S-speech slurred or non-existent    T-time-call 911 as soon as signs and symptoms begin-DO NOT go       Back to bed or wait to see if you get better-TIME IS BRAIN. Warning Signs of HEART ATTACK     Call 911 if you have these symptoms:   Chest discomfort. Most heart attacks involve discomfort in the center of the chest that lasts more than a few minutes, or that goes away and comes back. It can feel like uncomfortable pressure, squeezing, fullness, or pain.  Discomfort in other areas of the upper body. Symptoms can include pain or discomfort in one or both arms, the back, neck, jaw, or stomach.  Shortness of breath with or without chest discomfort.    Other signs may include breaking out in a cold sweat, nausea, or lightheadedness. Don't wait more than five minutes to call 911 - MINUTES MATTER! Fast action can save your life. Calling 911 is almost always the fastest way to get lifesaving treatment. Emergency Medical Services staff can begin treatment when they arrive -- up to an hour sooner than if someone gets to the hospital by car. The discharge information has been reviewed with the patient. The patient verbalized understanding. Discharge medications reviewed with the patient and appropriate educational materials and side effects teaching were provided.

## 2017-10-03 NOTE — PROGRESS NOTES
Discharge instructions and prescriptions provided and explained to patient, patient voiced understanding. Medication side effect sheet reviewed with pt. No home meds or valuables to return. Opportunity for questions provided. Pt getting the last of IV K+ at this time. pts ride is at bedside, pt to be discharged home once IV K+ is completed.

## 2017-10-03 NOTE — PROGRESS NOTES
GI DAILY PROGRESS NOTE    Admit Date:  9/28/2017    Today's Date:  10/3/2017    CC:  Hematochezia    Subjective:     Patient S/P EGD and colonoscopy yesterday with findings of duodenal ulcer and diverticulosis of colon; she admits to recurrent use of Advil. HGB stable overnight. She denies further bleeding or melena. Tolerating full liquids. She is hoping for discharge soon. Medications:   Current Facility-Administered Medications   Medication Dose Route Frequency    magnesium sulfate 1 g/100 ml IVPB (premix or compounded)  1 g IntraVENous ONCE    potassium chloride 20 mEq in 100 ml IVPB  20 mEq IntraVENous Q2H    montelukast (SINGULAIR) tablet 10 mg  10 mg Oral DAILY    pravastatin (PRAVACHOL) tablet 40 mg  40 mg Oral QHS    lidocaine (XYLOCAINE) 10 mg/mL (1 %) injection 0.1 mL  0.1 mL SubCUTAneous PRN    lactated Ringers infusion  100 mL/hr IntraVENous CONTINUOUS    midazolam (VERSED) injection 2 mg  2 mg IntraVENous ONCE PRN    metoclopramide HCl (REGLAN) injection 5 mg  5 mg IntraVENous Q6H    pantoprazole (PROTONIX) 40 mg in sodium chloride 0.9 % 10 mL injection  40 mg IntraVENous Q12H    zolpidem (AMBIEN) tablet 5 mg  5 mg Oral QHS PRN    sodium chloride (NS) flush 5-10 mL  5-10 mL IntraVENous Q8H    sodium chloride (NS) flush 5-10 mL  5-10 mL IntraVENous PRN    acetaminophen (TYLENOL) tablet 650 mg  650 mg Oral Q4H PRN    ondansetron (ZOFRAN ODT) tablet 4 mg  4 mg Oral Q4H PRN       Review of Systems:  ROS was obtained, with pertinent positives as listed above. No chest pain or SOB. Diet:  full    Objective:   Vitals:  Visit Vitals    /71    Pulse 68    Temp 98 °F (36.7 °C)    Resp 19    Ht 5' 2\" (1.575 m)    Wt 59.9 kg (132 lb)    SpO2 96%    Breastfeeding No    BMI 24.14 kg/m2     Intake/Output:     10/01 1901 - 10/03 0700  In: 7079 [P.O.:240;  I.V.:6839]  Out: 0   Exam:  General appearance: alert, cooperative, no distress  Lungs: clear to auscultation bilaterally anteriorly  Heart: regular rate and rhythm  Abdomen: soft, non-tender. Bowel sounds normal. No masses, no organomegaly  Extremities: extremities normal, atraumatic, no cyanosis or edema  Neuro:  alert and oriented    Data Review (Labs):    Recent Labs      10/03/17   0502  10/02/17   0340  10/01/17   0532  09/30/17   1645   WBC  4.9  3.8*  3.9*   --    HGB  10.4*  10.9*  11.2*   --    HCT  32.2*  32.0*  32.8*   --    PLT  408  459*  466*   --    MCV  85.6  83.6  83.5   --    NA  143  143  144   --    K  3.1*  3.4*  3.1*   --    CL  109*  109*  109*   --    CO2  25  23  25   --    BUN  2*  <1*  2*   --    CREA  0.58*  0.55*  0.62   --    CA  8.5  8.3  8.6   --    MG  1.8  1.2*   --    --    GLU  81  87  91   --    AML   --    --    --   119*   LPSE   --    --    --   697*   PTP   --    --    --   13.0*   INR   --    --    --   1.2   APTT   --    --    --   31.3       Assessment:     Principal Problem:    Intractable nausea and vomiting (9/28/2017)        Plan:      79 y.o. female with PMH of HTN, HLD, stroke, and depression, who is seen in consultation at the request of Dr. Randal Law for hematochezia. She underwent cholecystectomy 9/15/17 and has had nausea, vomiting, and abdominal pain. She was discharged from [facility] 9/25/17 and was admitted at [facility] 9/28/17 for tachycardia and hypotension. She has had 2 episodes of BRBPR, but stool for occult blood was negative. She had episode of diarrhea this am without BRB or melena. She reports recent colonoscopy with findings of diverticulosis. She has never had EGD. She was mildly tender on exam epigastric pain and RUQ on admission. EGD yesterday with duodenal ulcer. Colonoscopy revealed diverticulosis. 1.  Hgb stable with no visible bleeding overnight  2. Continue bid Protonix at discharge  3. We will have office call patient to arrange for follow-up in our office after she goes home      Patient is seen and examined in collaboration with Dr. Laura Miranda.   Assessment and plan as per Dr. Gaby Thomas. Adriane Borrego NP    PT SEEN AND EXAMINED AND PLAN DISCUSSED AND IMPLEMENTED.   Isis Rodriguez MD

## 2017-10-03 NOTE — DISCHARGE SUMMARY
Hospitalist Discharge Summary   Patient ID:  Annabella Valadez  [id number]  79 y.o.  1950  Admit date: 9/28/2017  3:15 PM  Discharge date and time: 10/3/2017  Attending: Jose De Jesus Mtz MD  PCP:  [facility]:     Treatment Team: Attending Provider: Helio Claros MD; Utilization Review: Goldy Miles RN  Principal Diagnosis Duodenal ulcer   Principal Problem:    Duodenal ulcer (10/3/2017)    Active Problems:    Intractable nausea and vomiting (9/28/2017)      Hypertension (10/3/2017)      Pulmonary nodules (10/3/2017)       * Admission Diagnoses: Nausea and vomiting, intractability of vomiting not specified, unspecified vomiting type [R11.2]  Gastrointestinal hemorrhage, unspecified gastrointestinal hemorrhage type [K92.2]  * Discharge Diagnoses:    Hospital Problems as of 10/3/2017  Never Reviewed          Codes Class Noted - Resolved POA    * (Principal)Duodenal ulcer ICD-10-CM: K26.9  ICD-9-CM: 532.90  10/3/2017 - Present Yes        Hypertension (Chronic) ICD-10-CM: I10  ICD-9-CM: 401.9  10/3/2017 - Present Yes        Intractable nausea and vomiting ICD-10-CM: R11.2  ICD-9-CM: 536.2  9/28/2017 - Present Yes                Hospital Course:  Please refer to the admission H&P for details of presentation. In summary,Ms. Annabella Valadez is a 79 y.o. female with PMHx of HTN, HLD, previous CVA, depression who was admitted on 9/28/17 for worsening nausea and vomiting. Noted Hematochezia. Ms. Mary Cast was recent admitted at [facility] and had cholecystectomy on 9/15/2017 and subsequent 10 days hospitalization for 10 days following procedure for nausea and vomiting. Evaluated by PCP on 9/28/17 and told to present to ED due to tachycardia and hypotension. Admits use of Advil. Had Nuclear scan at [facility] on 9/2017 with o bile leak or CBD obstruction. CT abdomen/eplvis doene at that time with no acute pathology. CT chest done in ED with no PE. Had sightly elevated lipase.  Gastroenterology consultedaAnd had EGD and colonoscopy  on 10/2/2017 that showed a duodenal ulcer and diverticulosis. Placed on PPI BID. H/H  and vital were stable. Tolerating feedings without any nausea or vomiting. Advised to hold ASA for 7 -10 days and to continue PPI PO BID. Advised to follow-up with PCP in 3-5 days with repeat CBC, BMP and Mg at that time. Will need further follow-up with CT chest for pulmonary nodules in 6 months or per PCP discretion. Advised to follow-up with Gastroenterology in 2 weeks. At the time of discharge HPilory test area pending - advised to discuss the results with PCP and GI for further management. Diagnostic Study/Procedure results summary copied from within [facility] EMR:      Labs: Results:       Chemistry Recent Labs      10/03/17   0502  10/02/17   0340  10/01/17   0532   GLU  81  87  91   NA  143  143  144   K  3.1*  3.4*  3.1*   CL  109*  109*  109*   CO2  25  23  25   BUN  2*  <1*  2*   CREA  0.58*  0.55*  0.62   CA  8.5  8.3  8.6   AGAP  9  11  10      CBC w/Diff Recent Labs      10/03/17   0502  10/02/17   0340  10/01/17   0532   WBC  4.9  3.8*  3.9*   RBC  3.76*  3.83*  3.93*   HGB  10.4*  10.9*  11.2*   HCT  32.2*  32.0*  32.8*   PLT  408  459*  466*          Coagulation Recent Labs      09/30/17   1645   PTP  13.0*   INR  1.2   APTT  31.3                   Thyroid Studies Lab Results   Component Value Date/Time    TSH 2.080 09/28/2017 04:45 PM          All Micro Results     None            Xr Abd Acute W 1 V Chest  Result Date: 9/28/2017  IMPRESSION: Negative for free air, ileus or obstruction. Ct Chest W Cont  Result Date: 9/28/2017  IMPRESSION: 1. No CT evidence of pulmonary embolus. 2.  Small nodules in the right lung base, likely benign. If the patient has a smoking history or other risk factors, consider 6 month follow-up.      [facility]  Result Date: 9/28/2017  IMPRESSION: Unremarkable right upper quadrant ultrasound           Discharge Exam:  Patient Vitals for the past 24 hrs:   Temp Pulse Resp BP SpO2   10/03/17 0731 98 °F (36.7 °C) 68 19 124/71 96 %   10/03/17 0524 97.5 °F (36.4 °C) 77 18 117/79 95 %   10/03/17 0012 98.1 °F (36.7 °C) 95 18 110/73 94 %   10/02/17 1930 97.7 °F (36.5 °C) (!) 102 18 123/78 97 %   10/02/17 1356 97.9 °F (36.6 °C) 88 16 114/72 98 %   10/02/17 1241 - 92 16 121/76 94 %   10/02/17 1231 - 89 16 122/76 98 %   10/02/17 1221 - 87 16 122/75 99 %   10/02/17 1212 98.6 °F (37 °C) (!) 106 16 113/70 97 %     Temp (24hrs), Av °F (36.7 °C), Min:97.5 °F (36.4 °C), Max:98.6 °F (37 °C)    Oxygen Therapy  O2 Sat (%): 96 % (10/03/17 0731)  Pulse via Oximetry: 88 beats per minute (10/02/17 1221)  O2 Device: Room air (10/02/17 1241)  O2 Flow Rate (L/min): 2 l/min (17 0834)  FIO2 (%): 28 % (17 0612)    Intake/Output Summary (Last 24 hours) at 10/03/17 1018  Last data filed at 10/03/17 0959   Gross per 24 hour   Intake             7319 ml   Output                0 ml   Net             7319 ml       Physical Exam:  General:         AAOx3. No acute distress. Afebrile. Cooperative. HEENT:               NCAT. No obvious deformity. Nares normal. No drainage  Lungs:                  CTABL. No wheezing/rhonchi/rales  Cardiovascular:   RRR. No m/r/g. No pedal edema b/l. +2 PT/DT pulses b/l. Abdomen:       S/nt/nd. Bowel sounds normal. .   Skin:         No rashes or lesions. Not Jaundiced  Neurologic:    AAOx3. WNL. No gross focal deficit. Moves all extremities. Heme/Lymph/Immune: No petechiae, echymoses, overt signs of bleeding or lymphadenopathy. Psychiatric:         Euthymic. Normal affect. Disposition: home  Discharge Condition: stable  Patient Instructions:   Current Discharge Medication List      START taking these medications    Details   acetaminophen (TYLENOL) 325 mg tablet Take 2 Tabs by mouth every four (4) hours as needed. Qty: 12 Tab, Refills: 0      atorvastatin (LIPITOR) 40 mg tablet Take 1 Tab by mouth nightly.   Qty: 1 Tab, Refills: 0      pantoprazole (PROTONIX) 40 mg tablet Take 1 Tab by mouth two (2) times a day. Qty: 60 Tab, Refills: 0      promethazine (PHENERGAN) 25 mg tablet Take 1 Tab by mouth every eight (8) hours as needed for Nausea. Qty: 12 Tab, Refills: 0      metoprolol tartrate (LOPRESSOR) 25 mg tablet Take 0.5 Tabs by mouth two (2) times a day. Qty: 1 Tab, Refills: 0         CONTINUE these medications which have CHANGED    Details   aspirin (ASPIRIN) 325 mg tablet Take 1 Tab by mouth daily. Qty: 1 Tab, Refills: 0      lisinopril (PRINIVIL, ZESTRIL) 5 mg tablet Take 0.5 Tabs by mouth daily. Qty: 30 Tab, Refills: 0         CONTINUE these medications which have NOT CHANGED    Details   HYDROcodone-acetaminophen (NORCO) 5-325 mg per tablet Take 1 Tab by mouth every six (6) hours as needed. Max Daily Amount: 4 Tabs. Qty: 20 Tab, Refills: 0      montelukast (SINGULAIR) 10 mg tablet Take 10 mg by mouth daily. Cetirizine (ZYRTEC) 10 mg cap Take  by mouth. PARoxetine (PAXIL) 10 mg tablet Take 10 mg by mouth daily. STOP taking these medications       pravastatin (PRAVACHOL) 40 mg tablet Comments:   Reason for Stopping:               Activity: Activity as tolerated and no driving for today  Diet: DIET NUTRITIONAL SUPPLEMENTS All Meals; Ensure Clear  DIET GI SOFT No options chosen  Wound Care: None needed      Full Code   Surrogate decision maker: discussed with patient.  Care team.   Pneumonia and flu vaccine to be administered at discharge per hospital protocol     Follow-up  Follow-up Information     Follow up With Details Comments Contact Info    [facility] On 10/25/2017 at 10:15 [address] [facility] [address]  [phone]    [facility] [address] [place] [address] On 10/5/2017 at 9:00 [facility]  [phone]          Time spent to discharge patient 32 minutes   Signed:  Thierno Maurice MD  10/3/2017

## 2017-10-03 NOTE — PROGRESS NOTES
Pt had enema 10/2 9 Am so Cdiff cancelled. Pt must have 3 watery stools after 9 AM today, has only had 1. Per Dr. Rogelio Fairbanks, Pt can go home after IV k+ done if doesn't meet the stool criteria by that time.

## 2017-10-04 LAB — H PYLORI IGM SER-ACNC: <9 UNITS (ref 0–8.9)

## 2017-10-05 LAB
BACTERIA SPEC CULT: NORMAL
H PYLORI AG STL QL IA: NEGATIVE
SERVICE CMNT-IMP: NORMAL
SPECIMEN SOURCE: NORMAL

## 2017-10-09 ENCOUNTER — HOSPITAL ENCOUNTER (EMERGENCY)
Age: 67
Discharge: HOME | End: 2017-10-09
Attending: EMERGENCY MEDICINE
Payer: MEDICARE

## 2017-10-09 VITALS
HEIGHT: 62 IN | TEMPERATURE: 98 F | BODY MASS INDEX: 22.08 KG/M2 | RESPIRATION RATE: 18 BRPM | SYSTOLIC BLOOD PRESSURE: 108 MMHG | DIASTOLIC BLOOD PRESSURE: 60 MMHG | OXYGEN SATURATION: 98 % | HEART RATE: 100 BPM | WEIGHT: 120 LBS

## 2017-10-09 DIAGNOSIS — K26.9 DUODENAL ULCER: Primary | ICD-10-CM

## 2017-10-09 DIAGNOSIS — R11.2 NAUSEA AND VOMITING, INTRACTABILITY OF VOMITING NOT SPECIFIED, UNSPECIFIED VOMITING TYPE: ICD-10-CM

## 2017-10-09 LAB
ALBUMIN SERPL-MCNC: 3.2 G/DL (ref 3.2–4.6)
ALBUMIN/GLOB SERPL: 0.7 {RATIO} (ref 1.2–3.5)
ALP SERPL-CCNC: 79 U/L (ref 50–136)
ALT SERPL-CCNC: 20 U/L (ref 12–65)
ANION GAP SERPL CALC-SCNC: 13 MMOL/L (ref 7–16)
AST SERPL-CCNC: 36 U/L (ref 15–37)
BASOPHILS # BLD: 0 K/UL (ref 0–0.2)
BASOPHILS NFR BLD: 0 % (ref 0–2)
BILIRUB SERPL-MCNC: 1.1 MG/DL (ref 0.2–1.1)
BUN SERPL-MCNC: 7 MG/DL (ref 8–23)
CALCIUM SERPL-MCNC: 9.4 MG/DL (ref 8.3–10.4)
CHLORIDE SERPL-SCNC: 100 MMOL/L (ref 98–107)
CO2 SERPL-SCNC: 24 MMOL/L (ref 21–32)
CREAT SERPL-MCNC: 0.72 MG/DL (ref 0.6–1)
DIFFERENTIAL METHOD BLD: ABNORMAL
EOSINOPHIL # BLD: 0.4 K/UL (ref 0–0.8)
EOSINOPHIL NFR BLD: 5 % (ref 0.5–7.8)
ERYTHROCYTE [DISTWIDTH] IN BLOOD BY AUTOMATED COUNT: 13.4 % (ref 11.9–14.6)
GLOBULIN SER CALC-MCNC: 4.4 G/DL (ref 2.3–3.5)
GLUCOSE SERPL-MCNC: 65 MG/DL (ref 65–100)
HCT VFR BLD AUTO: 38 % (ref 35.8–46.3)
HGB BLD-MCNC: 12.3 G/DL (ref 11.7–15.4)
IMM GRANULOCYTES # BLD: 0 K/UL (ref 0–0.5)
IMM GRANULOCYTES NFR BLD: 0.3 % (ref 0–5)
LIPASE SERPL-CCNC: 686 U/L (ref 73–393)
LYMPHOCYTES # BLD: 2.6 K/UL (ref 0.5–4.6)
LYMPHOCYTES NFR BLD: 39 % (ref 13–44)
MCH RBC QN AUTO: 28.2 PG (ref 26.1–32.9)
MCHC RBC AUTO-ENTMCNC: 32.4 G/DL (ref 31.4–35)
MCV RBC AUTO: 87.2 FL (ref 79.6–97.8)
MONOCYTES # BLD: 0.6 K/UL (ref 0.1–1.3)
MONOCYTES NFR BLD: 9 % (ref 4–12)
NEUTS SEG # BLD: 3.2 K/UL (ref 1.7–8.2)
NEUTS SEG NFR BLD: 47 % (ref 43–78)
PLATELET # BLD AUTO: 332 K/UL (ref 150–450)
PMV BLD AUTO: 9.6 FL (ref 10.8–14.1)
POTASSIUM SERPL-SCNC: 3.9 MMOL/L (ref 3.5–5.1)
PROT SERPL-MCNC: 7.6 G/DL (ref 6.3–8.2)
RBC # BLD AUTO: 4.36 M/UL (ref 4.05–5.25)
SODIUM SERPL-SCNC: 137 MMOL/L (ref 136–145)
WBC # BLD AUTO: 6.7 K/UL (ref 4.3–11.1)

## 2017-10-09 PROCEDURE — 74011250636 HC RX REV CODE- 250/636: Performed by: PHYSICIAN ASSISTANT

## 2017-10-09 PROCEDURE — 99284 EMERGENCY DEPT VISIT MOD MDM: CPT | Performed by: EMERGENCY MEDICINE

## 2017-10-09 PROCEDURE — 96374 THER/PROPH/DIAG INJ IV PUSH: CPT | Performed by: EMERGENCY MEDICINE

## 2017-10-09 PROCEDURE — 96361 HYDRATE IV INFUSION ADD-ON: CPT | Performed by: EMERGENCY MEDICINE

## 2017-10-09 PROCEDURE — 74011250636 HC RX REV CODE- 250/636: Performed by: EMERGENCY MEDICINE

## 2017-10-09 PROCEDURE — 80053 COMPREHEN METABOLIC PANEL: CPT | Performed by: PHYSICIAN ASSISTANT

## 2017-10-09 PROCEDURE — 85025 COMPLETE CBC W/AUTO DIFF WBC: CPT | Performed by: PHYSICIAN ASSISTANT

## 2017-10-09 PROCEDURE — 83690 ASSAY OF LIPASE: CPT | Performed by: PHYSICIAN ASSISTANT

## 2017-10-09 RX ORDER — PROMETHAZINE HYDROCHLORIDE 25 MG/1
25 SUPPOSITORY RECTAL
Qty: 12 SUPPOSITORY | Refills: 0 | Status: SHIPPED | OUTPATIENT
Start: 2017-10-09

## 2017-10-09 RX ORDER — ONDANSETRON 2 MG/ML
4 INJECTION INTRAMUSCULAR; INTRAVENOUS
Status: COMPLETED | OUTPATIENT
Start: 2017-10-09 | End: 2017-10-09

## 2017-10-09 RX ADMIN — SODIUM CHLORIDE 1000 ML: 900 INJECTION, SOLUTION INTRAVENOUS at 19:04

## 2017-10-09 RX ADMIN — SODIUM CHLORIDE 1000 ML: 900 INJECTION, SOLUTION INTRAVENOUS at 13:52

## 2017-10-09 RX ADMIN — ONDANSETRON 4 MG: 2 INJECTION INTRAMUSCULAR; INTRAVENOUS at 17:34

## 2017-10-09 NOTE — ED TRIAGE NOTES
Patient complains of N/V for several weeks. Patient had gallbladder removed recently as well as found an ulcer. Seen PCP this morning and was told to come to the ED.

## 2017-10-09 NOTE — ED PROVIDER NOTES
HPI   55-year-old female presenting to the emergency department for evaluation of vomiting and diarrhea and concern for dehydration. Patient has a significant past medical history. .  She reports  Nausea vomiting and diarrhea over the last 1-2 months. She has recently had a cholecystectomy for this. Subsequently she was found to be dehydrated, and was admitted to this hospital where further evaluation revealed a duodenal ulcer. She is placed on treatment for this after having been seen by gastroenterology. She states that her diarrhea and vomiting have not stopped at any point. She is having 1-2 episodes of vomiting a day at a minimum. She is also having  Watery foul-smelling diarrhea. She does state that she took a stool specimen to her gastroenterologist office for testing but is not sure what tests there were running on it. Today she went for her regular follow-up after her cholecystectomy with her surgeon. On evaluation in his office he felt that she appeared dehydrated and recommended she go to the emergency department for further evaluation. Past Medical History:   Diagnosis Date    Asthma     COPD (chronic obstructive pulmonary disease) ([facility] [address]     Hypertension     Sleep apnea     does not use C-pap    Stroke [facility])        Past Surgical History:   Procedure Laterality Date    FLEXIBLE SIGMOIDOSCOPY N/A 10/2/2017    SIGMOIDOSCOPY FLEXIBLE performed [facility] Huey Brown MD [facility] ENDOSCOPY    HX CHOLECYSTECTOMY      HX GYN      hysterectomy    HX HEENT      left ear surgery         History reviewed. No pertinent family history. Social History     Social History    Marital status:      Spouse name: N/A    Number of children: N/A    Years of education: N/A     Occupational History    Not on file.      Social History Main Topics    Smoking status: Never Smoker    Smokeless tobacco: Never Used    Alcohol use No    Drug use: No    Sexual activity: Not on file     Other Topics Concern    Not on file     Social History Narrative         ALLERGIES: Review of patient's allergies indicates no known allergies. Review of Systems   Constitutional: Positive for fatigue. Negative for fever. HENT: Negative. Eyes: Negative. Respiratory: Negative for cough, chest tightness, shortness of breath and wheezing. Cardiovascular: Negative for chest pain. Gastrointestinal: Positive for diarrhea, nausea and vomiting. Negative for abdominal distention, abdominal pain and constipation. Endocrine: Negative. Genitourinary: Negative for dysuria, flank pain, frequency and urgency. Neurological: Negative for dizziness, syncope and headaches. Psychiatric/Behavioral: Negative. All other systems reviewed and are negative. Vitals:    10/09/17 1350   BP: 113/78   Pulse: (!) 115   Resp: 16   Temp: 97.1 °F (36.2 °C)   SpO2: 98%   Weight: 54.4 kg (120 lb)   Height: 5' 2\" (1.575 m)            Physical Exam   Constitutional: She is oriented to person, place, and time. She appears well-developed and well-nourished. No distress. HENT:   Head: Normocephalic and atraumatic. Dry mucous membranes   Eyes: EOM are normal. Pupils are equal, round, and reactive to light. Neck: Normal range of motion. Neck supple. Cardiovascular: Regular rhythm and normal heart sounds. Exam reveals no gallop and no friction rub. No murmur heard. tachycardic   Pulmonary/Chest: Effort normal and breath sounds normal. No stridor. No respiratory distress. She has no wheezes. Abdominal: Soft. Bowel sounds are normal. She exhibits no distension and no mass. There is tenderness. There is no rebound and no guarding. Laparoscopy scars over the abdomen, mild epigastric tenderness to palpation   Musculoskeletal: Normal range of motion. She exhibits no edema, tenderness or deformity. Neurological: She is alert and oriented to person, place, and time. Skin: Skin is warm and dry. No rash noted.  She is not diaphoretic. No erythema. Psychiatric: She has a normal mood and affect. Her behavior is normal.   Vitals reviewed. MDM  Number of Diagnoses or Management Options  Duodenal ulcer:   Nausea and vomiting, intractability of vomiting not specified, unspecified vomiting type:   Diagnosis management comments: Differential diagnosis: Peptic ulcer disease, gastritis, pancreatitis, intractable nausea and vomiting, C. Difficile colitis, hypokalemia,    19:42 - on reevaluation, the patient is feeling improved slightly after hydration and nausea medication. Her lipase is noted to be elevated in the mid 600s which was previously as well. Otherwise her laboratory analysis is unremarkable. Her abdominal exam is reassuring. I did contact gastroenterology on call and discussed the case with them. They feel like her lipase is likely elevated due to chronic vomiting. Her stool studies I was able to review and these are negative as well with her C. Difficile still pending. However clinically she does not appear to be consistent with Clostridium difficile given that she is not febrile and no leukocytosis and no significant abdominal tenderness to palpation. At this point the patient would like to be discharged home. I think this is reasonable, she is not hypokalemic. He is being given a by mouth challenge here which she tolerated. I will discharge her home with instructions to follow-up with gastroenterology. I discussed the case with them and they've agreed to worker and this week for reevaluation to ensure that she is improving. Also recommended ensuring that she has Phenergan suppositories available at home. In discussing with her further, it sounds like her vomiting is unrelated to eating and she is actually able to keep down food. We discussed scheduling her anti-emetic regularly to try to avoid the nausea and the first place.   Additionally she is going to drink ensures to try to increase her caloric intake. If she is unable to tolerate orals at home or her symptoms are getting worse she should return to the emergency department as she may require inpatient treatment at that time. Amount and/or Complexity of Data Reviewed  Clinical lab tests: ordered and reviewed  Review and summarize past medical records: yes [facility] Course:  Please refer to the admission H&P for details of presentation. In summary,Ms. Wade Mccarthy is a 79 y.o. female with PMHx of HTN, HLD, previous CVA, depression who was admitted on 9/28/17 for worsening nausea and vomiting. Noted Hematochezia. Ms. Samina Rock was recent admitted at [facility] and had cholecystectomy on 9/15/2017 and subsequent 10 days hospitalization for 10 days following procedure for nausea and vomiting. Evaluated by PCP on 9/28/17 and told to present to ED due to tachycardia and hypotension. Admits use of Advil. Had Nuclear scan at [facility] on 9/2017 with o bile leak or CBD obstruction. CT abdomen/eplvis doene at that time with no acute pathology. CT chest done in ED with no PE. Had sightly elevated lipase. Gastroenterology consultedaAnd had EGD and colonoscopy  on 10/2/2017 that showed a duodenal ulcer and diverticulosis. Placed on PPI BID. H/H  and vital were stable. Tolerating feedings without any nausea or vomiting. Advised to hold ASA for 7 -10 days and to continue PPI PO BID. Advised to follow-up with PCP in 3-5 days with repeat CBC, BMP and Mg at that time. Will need further follow-up with CT chest for pulmonary nodules in 6 months or per PCP discretion. Advised to follow-up with Gastroenterology in 2 weeks.  At the time of discharge HPilory test area pending - advised to discuss the results with PCP and GI for further management. )      ED Course       Procedures

## 2017-10-09 NOTE — ED TRIAGE NOTES
Felicita Farooq is a 79 y.o. female here for nv for past 3 weeks since gallbladder removed, here last week admitted, dx with ulcer, still with nv diarhea . Rapid assessment performed. --- Orders were placed.   --- Patient will be placed in lobby  Signed By: CARLA Dave     October 9, 2017

## 2017-10-10 NOTE — DISCHARGE INSTRUCTIONS
Nausea and Vomiting: Care Instructions  Your Care Instructions    When you are nauseated, you may feel weak and sweaty and notice a lot of saliva in your mouth. Nausea often leads to vomiting. Most of the time you do not need to worry about nausea and vomiting, but they can be signs of other illnesses. Two common causes of nausea and vomiting are stomach flu and food poisoning. Nausea and vomiting from viral stomach flu will usually start to improve within 24 hours. Nausea and vomiting from food poisoning may last from 12 to 48 hours. The doctor has checked you carefully, but problems can develop later. If you notice any problems or new symptoms, get medical treatment right away. Follow-up care is a key part of your treatment and safety. Be sure to make and go to all appointments, and call your doctor if you are having problems. It's also a good idea to know your test results and keep a list of the medicines you take. How can you care for yourself at home? · To prevent dehydration, drink plenty of fluids, enough so that your urine is light yellow or clear like water. Choose water and other caffeine-free clear liquids until you feel better. If you have kidney, heart, or liver disease and have to limit fluids, talk with your doctor before you increase the amount of fluids you drink. · Rest in bed until you feel better. · When you are able to eat, try clear soups, mild foods, and liquids until all symptoms are gone for 12 to 48 hours. Other good choices include dry toast, crackers, cooked cereal, and gelatin dessert, such as Jell-O. When should you call for help? Call 911 anytime you think you may need emergency care. For example, call if:  · You passed out (lost consciousness). Call your doctor now or seek immediate medical care if:  · You have symptoms of dehydration, such as:  ¨ Dry eyes and a dry mouth. ¨ Passing only a little dark urine.   ¨ Feeling thirstier than usual.  · You have new or worsening belly pain. · You have a new or higher fever. · You vomit blood or what looks like coffee grounds. Watch closely for changes in your health, and be sure to contact your doctor if:  · You have ongoing nausea and vomiting. · Your vomiting is getting worse. · Your vomiting lasts longer than 2 days. · You are not getting better as expected. Where can you learn more? Go to http://gloria-atul.info/. Enter 25 [id number] in the search box to learn more about \"Nausea and Vomiting: Care Instructions. \"  Current as of: March 20, 2017  Content Version: 11.3  © 3705-4838 [employer]. Care instructions adapted under license by [employer] (which disclaims liability or warranty for this information). If you have questions about a medical condition or this instruction, always ask your healthcare professional. [address] any warranty or liability for your use of this information.

## 2017-10-10 NOTE — ED NOTES
I have reviewed discharge instructions with the patient. The patient verbalized understanding. Patient left ED via Discharge Method: ambulatory to Home with home. Opportunity for questions and clarification provided. Patient given 1 scripts.

## 2017-10-11 ENCOUNTER — HOSPITAL ENCOUNTER (OUTPATIENT)
Dept: CT IMAGING | Age: 67
Discharge: HOME | End: 2017-10-11
Attending: INTERNAL MEDICINE
Payer: MEDICARE

## 2017-10-11 DIAGNOSIS — R10.32 LLQ PAIN: ICD-10-CM

## 2017-10-11 DIAGNOSIS — R68.83 CHILLS WITHOUT FEVER: ICD-10-CM

## 2017-10-11 PROCEDURE — 74177 CT ABD & PELVIS W/CONTRAST: CPT

## 2017-10-11 PROCEDURE — 74011636320 HC RX REV CODE- 636/320: Performed by: INTERNAL MEDICINE

## 2017-10-11 PROCEDURE — 74011000258 HC RX REV CODE- 258: Performed by: INTERNAL MEDICINE

## 2017-10-11 RX ORDER — SODIUM CHLORIDE 0.9 % (FLUSH) 0.9 %
10 SYRINGE (ML) INJECTION
Status: COMPLETED | OUTPATIENT
Start: 2017-10-11 | End: 2017-10-11

## 2017-10-11 RX ADMIN — DIATRIZOATE MEGLUMINE AND DIATRIZOATE SODIUM 15 ML: 660; 100 LIQUID ORAL; RECTAL at 15:08

## 2017-10-11 RX ADMIN — SODIUM CHLORIDE 100 ML: 900 INJECTION, SOLUTION INTRAVENOUS at 15:08

## 2017-10-11 RX ADMIN — Medication 10 ML: at 15:08

## 2017-10-11 RX ADMIN — IOPAMIDOL 100 ML: 755 INJECTION, SOLUTION INTRAVENOUS at 15:08

## 2018-01-29 ENCOUNTER — HOSPITAL ENCOUNTER (OUTPATIENT)
Dept: GENERAL RADIOLOGY | Age: 68
Discharge: HOME | End: 2018-01-29
Attending: INTERNAL MEDICINE
Payer: MEDICARE

## 2018-01-29 DIAGNOSIS — R74.8 ACID PHOSPHATASE ELEVATED: ICD-10-CM

## 2018-01-29 DIAGNOSIS — Z87.11 H/O PEPTIC ULCER: ICD-10-CM

## 2018-01-29 DIAGNOSIS — K76.9 PLEURAL EFFUSION ASSOCIATED WITH HEPATIC DISORDER: ICD-10-CM

## 2018-01-29 DIAGNOSIS — J91.8 PLEURAL EFFUSION ASSOCIATED WITH HEPATIC DISORDER: ICD-10-CM

## 2018-01-29 DIAGNOSIS — R63.4 LOSS OF WEIGHT: ICD-10-CM

## 2018-01-29 PROCEDURE — 71046 X-RAY EXAM CHEST 2 VIEWS: CPT

## (undated) DEVICE — BLOCK BITE AD 60FR W/ VELC STRP ADDRESSES MOST PT AND

## (undated) DEVICE — CANNULA NSL ORAL AD FOR CAPNOFLEX CO2 O2 AIRLFE

## (undated) DEVICE — SYR 3ML LL TIP 1/10ML GRAD --

## (undated) DEVICE — CONTAINER PREFIL FRMLN 40ML --

## (undated) DEVICE — KENDALL RADIOLUCENT FOAM MONITORING ELECTRODE RECTANGULAR SHAPE: Brand: KENDALL

## (undated) DEVICE — CONNECTOR TBNG OD5-7MM O2 END DISP

## (undated) DEVICE — NDL PRT INJ NSAF BLNT 18GX1.5 --

## (undated) DEVICE — SYR 5ML 1/5 GRAD LL NSAF LF --

## (undated) DEVICE — FORCEPS BX L240CM JAW DIA2.8MM L CAP W/ NDL MIC MESH TOOTH